# Patient Record
Sex: FEMALE | Race: WHITE | NOT HISPANIC OR LATINO | Employment: OTHER | URBAN - METROPOLITAN AREA
[De-identification: names, ages, dates, MRNs, and addresses within clinical notes are randomized per-mention and may not be internally consistent; named-entity substitution may affect disease eponyms.]

---

## 2017-02-22 ENCOUNTER — APPOINTMENT (OUTPATIENT)
Dept: WOMENS IMAGING | Facility: HOSPITAL | Age: 72
End: 2017-02-22

## 2017-02-22 PROCEDURE — 76641 ULTRASOUND BREAST COMPLETE: CPT | Performed by: RADIOLOGY

## 2017-02-22 PROCEDURE — MDREVIEWSP: Performed by: RADIOLOGY

## 2017-08-23 ENCOUNTER — APPOINTMENT (OUTPATIENT)
Dept: WOMENS IMAGING | Facility: HOSPITAL | Age: 72
End: 2017-08-23

## 2017-08-23 PROCEDURE — 76641 ULTRASOUND BREAST COMPLETE: CPT | Performed by: RADIOLOGY

## 2017-08-23 PROCEDURE — G0202 SCR MAMMO BI INCL CAD: HCPCS | Performed by: RADIOLOGY

## 2017-08-23 PROCEDURE — 77063 BREAST TOMOSYNTHESIS BI: CPT | Performed by: RADIOLOGY

## 2018-01-04 ENCOUNTER — HOSPITAL ENCOUNTER (OUTPATIENT)
Dept: GENERAL RADIOLOGY | Facility: HOSPITAL | Age: 73
Discharge: HOME | End: 2018-01-04
Admitting: ORTHOPAEDIC SURGERY

## 2018-01-04 ENCOUNTER — APPOINTMENT (OUTPATIENT)
Dept: PREADMISSION TESTING | Facility: HOSPITAL | Age: 73
End: 2018-01-04

## 2018-01-04 ENCOUNTER — HOSPITAL ENCOUNTER (OUTPATIENT)
Dept: GENERAL RADIOLOGY | Facility: HOSPITAL | Age: 73
Discharge: HOME | End: 2018-01-04

## 2018-01-04 VITALS
WEIGHT: 206 LBS | SYSTOLIC BLOOD PRESSURE: 111 MMHG | BODY MASS INDEX: 35.17 KG/M2 | TEMPERATURE: 97.4 F | OXYGEN SATURATION: 94 % | RESPIRATION RATE: 18 BRPM | HEIGHT: 64 IN | HEART RATE: 92 BPM | DIASTOLIC BLOOD PRESSURE: 74 MMHG

## 2018-01-04 LAB
ALBUMIN SERPL-MCNC: 4.4 G/DL (ref 3.5–5.2)
ALBUMIN/GLOB SERPL: 1.4 G/DL
ALP SERPL-CCNC: 77 U/L (ref 39–117)
ALT SERPL W P-5'-P-CCNC: 11 U/L (ref 1–33)
ANION GAP SERPL CALCULATED.3IONS-SCNC: 14.7 MMOL/L
APTT PPP: 25.3 SECONDS (ref 22.7–35.4)
AST SERPL-CCNC: 10 U/L (ref 1–32)
BASOPHILS # BLD AUTO: 0.03 10*3/MM3 (ref 0–0.2)
BASOPHILS NFR BLD AUTO: 0.3 % (ref 0–1.5)
BILIRUB SERPL-MCNC: 0.6 MG/DL (ref 0.1–1.2)
BUN BLD-MCNC: 23 MG/DL (ref 8–23)
BUN/CREAT SERPL: 24.7 (ref 7–25)
CALCIUM SPEC-SCNC: 9.6 MG/DL (ref 8.6–10.5)
CHLORIDE SERPL-SCNC: 98 MMOL/L (ref 98–107)
CO2 SERPL-SCNC: 27.3 MMOL/L (ref 22–29)
CREAT BLD-MCNC: 0.93 MG/DL (ref 0.57–1)
DEPRECATED RDW RBC AUTO: 51.6 FL (ref 37–54)
EOSINOPHIL # BLD AUTO: 0.04 10*3/MM3 (ref 0–0.7)
EOSINOPHIL NFR BLD AUTO: 0.4 % (ref 0.3–6.2)
ERYTHROCYTE [DISTWIDTH] IN BLOOD BY AUTOMATED COUNT: 14.3 % (ref 11.7–13)
GFR SERPL CREATININE-BSD FRML MDRD: 59 ML/MIN/1.73
GLOBULIN UR ELPH-MCNC: 3.2 GM/DL
GLUCOSE BLD-MCNC: 136 MG/DL (ref 65–99)
HCT VFR BLD AUTO: 45.6 % (ref 35.6–45.5)
HGB BLD-MCNC: 15 G/DL (ref 11.9–15.5)
IMM GRANULOCYTES # BLD: 0.02 10*3/MM3 (ref 0–0.03)
IMM GRANULOCYTES NFR BLD: 0.2 % (ref 0–0.5)
INR PPP: 1.02 (ref 0.9–1.1)
LYMPHOCYTES # BLD AUTO: 2.88 10*3/MM3 (ref 0.9–4.8)
LYMPHOCYTES NFR BLD AUTO: 27.9 % (ref 19.6–45.3)
MCH RBC QN AUTO: 32.4 PG (ref 26.9–32)
MCHC RBC AUTO-ENTMCNC: 32.9 G/DL (ref 32.4–36.3)
MCV RBC AUTO: 98.5 FL (ref 80.5–98.2)
MONOCYTES # BLD AUTO: 0.69 10*3/MM3 (ref 0.2–1.2)
MONOCYTES NFR BLD AUTO: 6.7 % (ref 5–12)
NEUTROPHILS # BLD AUTO: 6.67 10*3/MM3 (ref 1.9–8.1)
NEUTROPHILS NFR BLD AUTO: 64.5 % (ref 42.7–76)
PLATELET # BLD AUTO: 310 10*3/MM3 (ref 140–500)
PMV BLD AUTO: 10.4 FL (ref 6–12)
POTASSIUM BLD-SCNC: 3.9 MMOL/L (ref 3.5–5.2)
PROT SERPL-MCNC: 7.6 G/DL (ref 6–8.5)
PROTHROMBIN TIME: 13 SECONDS (ref 11.7–14.2)
RBC # BLD AUTO: 4.63 10*6/MM3 (ref 3.9–5.2)
SODIUM BLD-SCNC: 140 MMOL/L (ref 136–145)
WBC NRBC COR # BLD: 10.33 10*3/MM3 (ref 4.5–10.7)

## 2018-01-04 PROCEDURE — 85025 COMPLETE CBC W/AUTO DIFF WBC: CPT | Performed by: ORTHOPAEDIC SURGERY

## 2018-01-04 PROCEDURE — 36415 COLL VENOUS BLD VENIPUNCTURE: CPT

## 2018-01-04 PROCEDURE — 73560 X-RAY EXAM OF KNEE 1 OR 2: CPT

## 2018-01-04 PROCEDURE — 71046 X-RAY EXAM CHEST 2 VIEWS: CPT

## 2018-01-04 PROCEDURE — 93010 ELECTROCARDIOGRAM REPORT: CPT | Performed by: INTERNAL MEDICINE

## 2018-01-04 PROCEDURE — 93005 ELECTROCARDIOGRAM TRACING: CPT

## 2018-01-04 PROCEDURE — 85730 THROMBOPLASTIN TIME PARTIAL: CPT | Performed by: ORTHOPAEDIC SURGERY

## 2018-01-04 PROCEDURE — 85610 PROTHROMBIN TIME: CPT | Performed by: ORTHOPAEDIC SURGERY

## 2018-01-04 PROCEDURE — 80053 COMPREHEN METABOLIC PANEL: CPT | Performed by: ORTHOPAEDIC SURGERY

## 2018-01-04 RX ORDER — POTASSIUM CHLORIDE 1.5 G/1.77G
20 POWDER, FOR SOLUTION ORAL DAILY
COMMUNITY

## 2018-01-04 RX ORDER — SENNOSIDES 8.6 MG
650 CAPSULE ORAL EVERY 8 HOURS PRN
COMMUNITY

## 2018-01-04 RX ORDER — TERAZOSIN 10 MG/1
10 CAPSULE ORAL NIGHTLY
COMMUNITY

## 2018-01-04 RX ORDER — ASPIRIN 81 MG/1
81 TABLET, CHEWABLE ORAL EVERY MORNING
COMMUNITY

## 2018-01-04 RX ORDER — LISINOPRIL AND HYDROCHLOROTHIAZIDE 12.5; 1 MG/1; MG/1
1 TABLET ORAL EVERY MORNING
COMMUNITY

## 2018-01-04 RX ORDER — MELOXICAM 15 MG/1
15 TABLET ORAL EVERY MORNING
COMMUNITY

## 2018-01-04 RX ORDER — LANSOPRAZOLE 30 MG/1
30 CAPSULE, DELAYED RELEASE ORAL EVERY MORNING
COMMUNITY

## 2018-01-04 RX ORDER — ALPRAZOLAM 0.25 MG/1
0.25 TABLET ORAL 2 TIMES DAILY PRN
COMMUNITY

## 2018-01-04 ASSESSMENT — KOOS JR
KOOS JR SCORE: 50.012
KOOS JR SCORE: 15

## 2018-01-04 NOTE — DISCHARGE INSTRUCTIONS
SURGERY 1-11-18   ARRIVAL TIME 8:00    Take the following medications the morning of surgery with a small sip of water:    NONE      General Instructions:  • Do not eat solid food after midnight the night before surgery.  • You may drink clear liquids day of surgery but must stop at least one hour before your hospital arrival time.  • It is beneficial for you to have a clear drink that contains carbohydrates the day of surgery.  We suggest a 12 to 20 ounce bottle of Gatorade or Powerade for non-diabetic patients or a 12 to 20 ounce bottle of G2 or Powerade Zero for diabetic patients. (Pediatric patients, are not advised to drink a 12 to 20 ounce carbohydrate drink)    Clear liquids are liquids you can see through.  Nothing red in color.     Plain water                               Sports drinks  Sodas                                   Gelatin (Jell-O)  Fruit juices without pulp such as white grape juice and apple juice  Popsicles that contain no fruit or yogurt  Tea or coffee (no cream or milk added)  Gatorade / Powerade  G2 / Powerade Zero    • Infants may have breast milk up to four hours before surgery.  • Infants drinking formula may drink formula up to six hours before surgery.   • Patients who avoid smoking, chewing tobacco and alcohol for 4 weeks prior to surgery have a reduced risk of post-operative complications.  Quit smoking as many days before surgery as you can.  • Do not smoke, use chewing tobacco or drink alcohol the day of surgery.   • If applicable bring your C-PAP/ BI-PAP machine.  • Bring any papers given to you in the doctor’s office.  • Wear clean comfortable clothes and socks.  • Do not wear contact lenses or make-up.  Bring a case for your glasses.   • Bring crutches or walker if applicable.  • Remove all piercings.  Leave jewelry and any other valuables at home.  • Hair extensions with metal clips must be removed prior to surgery.  • The Pre-Admission Testing nurse will instruct you to bring  medications if unable to obtain an accurate list in Pre-Admission Testing.        If you were given a blood bank ID arm band remember to bring it with you the day of surgery.    Preventing a Surgical Site Infection:  • For 2 to 3 days before surgery, avoid shaving with a razor because the razor can irritate skin and make it easier to develop an infection.  • The night prior to surgery sleep in a clean bed with clean clothing.  Do not allow pets to sleep with you.  • Shower on the morning of surgery using a fresh bar of anti-bacterial soap (such as Dial) and clean washcloth.  Dry with a clean towel and dress in clean clothing.  • Ask your surgeon if you will be receiving antibiotics prior to surgery.  • Make sure you, your family, and all healthcare providers clean their hands with soap and water or an alcohol based hand  before caring for you or your wound.    Day of surgery:  Upon arrival, a Pre-op nurse and Anesthesiologist will review your health history, obtain vital signs, and answer questions you may have.  The only belongings needed at this time will be your home medications and if applicable your C-PAP/BI-PAP machine.  If you are staying overnight your family can leave the rest of your belongings in the car and bring them to your room later.  A Pre-op nurse will start an IV and you may receive medication in preparation for surgery, including something to help you relax.  Your family will be able to see you in the Pre-op area.  While you are in surgery your family should notify the waiting room  if they leave the waiting room area and provide a contact phone number.    Please be aware that surgery does come with discomfort.  We want to make every effort to control your discomfort so please discuss any uncontrolled symptoms with your nurse.   Your doctor will most likely have prescribed pain medications.      If you are going home after surgery you will receive individualized written care  instructions before being discharged.  A responsible adult must drive you to and from the hospital on the day of your surgery and stay with you for 24 hours.    If you are staying overnight following surgery, you will be transported to your hospital room following the recovery period.  [facility] has all private rooms.    If you have any questions please call Pre-Admission Testing at [phone].  Deductibles and co-payments are collected on the day of service. Please be prepared to pay the required co-pay, deductible or deposit on the day of service as defined by your plan.USE AS DIRECTED    2% CHLORAHEXIDINE GLUCONATE* CLOTH  Preparing or “prepping” skin before surgery can reduce the risk of infection at the surgical site. To make the process easier, [facility] has chosen disposable cloths moistened with a rinse-free, 2% Chlorhexidine Gluconate (CHG) antiseptic solution. The steps below outline the prepping process and should be carefully followed.        Use the prep cloth on the area that is circled in the diagram             Directions Night before Surgery  1) Shower using a fresh bar of anti-bacterial soap (such as Dial) and clean washcloth.  Use a clean towel to completely dry your skin.  2) Do not use any lotions, oils or creams on your skin.  3) Open the package and remove 1 cloth, wipe your skin for 30 seconds in a circular motion.  Allow to dry for 3 minutes.  4) Repeat #3 with second cloth.  5) Do not touch your eyes, ears, or mouth with the prep cloth.  6) Allow the wet prep solution to air dry.  7) Discard the prep cloth and wash your hands with soap and water.   8) Dress in clean bed clothes and sleep on fresh clean bed sheets.   9) You may experience some temporary itching after the prep.    Directions Day of Surgery  1) Repeat steps 1,2,3,4,5,6,7, and 9.   2) Dress in clean clothes before coming to the hospital.    BACTROBAN NASAL OINTMENT  There are many germs normally in  your nose. Bactroban is an ointment that will help reduce these germs. Please follow these instructions for Bactroban use:    ____Two days before surgery in the evening Date________    ____The day before surgery in the morning  Date________    ____The day before surgery in the evening              Date________    ____The day of surgery in the morning    Date________    **Squirt ½ package of Bactroban Ointment onto a cotton applicator and apply to inside of 1st nostril.  Squirt the remaining Bactroban and apply to the inside of the other nostril.

## 2018-01-05 LAB
BILIRUB UR QL STRIP: NEGATIVE
CLARITY UR: CLEAR
COLOR UR: YELLOW
GLUCOSE UR STRIP-MCNC: NEGATIVE MG/DL
HGB UR QL STRIP.AUTO: NEGATIVE
KETONES UR QL STRIP: NEGATIVE
LEUKOCYTE ESTERASE UR QL STRIP.AUTO: NEGATIVE
NITRITE UR QL STRIP: NEGATIVE
PH UR STRIP.AUTO: 6.5 [PH] (ref 5–8)
PROT UR QL STRIP: NEGATIVE
SP GR UR STRIP: 1.01 (ref 1–1.03)
UROBILINOGEN UR QL STRIP: NORMAL

## 2018-01-05 PROCEDURE — 81003 URINALYSIS AUTO W/O SCOPE: CPT | Performed by: ORTHOPAEDIC SURGERY

## 2018-01-11 ENCOUNTER — APPOINTMENT (OUTPATIENT)
Dept: GENERAL RADIOLOGY | Facility: HOSPITAL | Age: 73
End: 2018-01-11

## 2018-01-11 ENCOUNTER — ANESTHESIA (OUTPATIENT)
Dept: PERIOP | Facility: HOSPITAL | Age: 73
End: 2018-01-11

## 2018-01-11 ENCOUNTER — HOSPITAL ENCOUNTER (OUTPATIENT)
Facility: HOSPITAL | Age: 73
Discharge: HOME | End: 2018-01-13
Attending: ORTHOPAEDIC SURGERY | Admitting: ORTHOPAEDIC SURGERY

## 2018-01-11 ENCOUNTER — ANESTHESIA EVENT (OUTPATIENT)
Dept: PERIOP | Facility: HOSPITAL | Age: 73
End: 2018-01-11

## 2018-01-11 DIAGNOSIS — R26.89 DECREASED MOBILITY: Primary | ICD-10-CM

## 2018-01-11 PROBLEM — M17.9 OSTEOARTHRITIS, KNEE: Status: ACTIVE | Noted: 2018-01-11

## 2018-01-11 PROCEDURE — A9270 NON-COVERED ITEM OR SERVICE: HCPCS | Performed by: ORTHOPAEDIC SURGERY

## 2018-01-11 PROCEDURE — 63710000001 ASPIRIN EC 325 MG TABLET DELAYED-RELEASE: Performed by: ORTHOPAEDIC SURGERY

## 2018-01-11 PROCEDURE — 25010000002 HYDROMORPHONE PER 4 MG: Performed by: NURSE ANESTHETIST, CERTIFIED REGISTERED

## 2018-01-11 PROCEDURE — C1776 JOINT DEVICE (IMPLANTABLE): HCPCS | Performed by: ORTHOPAEDIC SURGERY

## 2018-01-11 PROCEDURE — 25010000002 FENTANYL CITRATE (PF) 100 MCG/2ML SOLUTION: Performed by: NURSE ANESTHETIST, CERTIFIED REGISTERED

## 2018-01-11 PROCEDURE — 25010000003 CEFAZOLIN IN DEXTROSE 2-4 GM/100ML-% SOLUTION: Performed by: NURSE ANESTHETIST, CERTIFIED REGISTERED

## 2018-01-11 PROCEDURE — 25010000002 ONDANSETRON PER 1 MG: Performed by: ORTHOPAEDIC SURGERY

## 2018-01-11 PROCEDURE — G8979 MOBILITY GOAL STATUS: HCPCS

## 2018-01-11 PROCEDURE — 25010000002 DEXAMETHASONE PER 1 MG: Performed by: NURSE ANESTHETIST, CERTIFIED REGISTERED

## 2018-01-11 PROCEDURE — 97110 THERAPEUTIC EXERCISES: CPT

## 2018-01-11 PROCEDURE — 25010000002 ONDANSETRON PER 1 MG: Performed by: NURSE ANESTHETIST, CERTIFIED REGISTERED

## 2018-01-11 PROCEDURE — 73560 X-RAY EXAM OF KNEE 1 OR 2: CPT

## 2018-01-11 PROCEDURE — G8978 MOBILITY CURRENT STATUS: HCPCS

## 2018-01-11 PROCEDURE — C1713 ANCHOR/SCREW BN/BN,TIS/BN: HCPCS | Performed by: ORTHOPAEDIC SURGERY

## 2018-01-11 PROCEDURE — 25010000002 VANCOMYCIN 10 G RECONSTITUTED SOLUTION: Performed by: ORTHOPAEDIC SURGERY

## 2018-01-11 PROCEDURE — 94799 UNLISTED PULMONARY SVC/PX: CPT

## 2018-01-11 PROCEDURE — 25010000002 CEFAZOLIN PER 500 MG: Performed by: ORTHOPAEDIC SURGERY

## 2018-01-11 PROCEDURE — 25010000002 MIDAZOLAM PER 1 MG: Performed by: ANESTHESIOLOGY

## 2018-01-11 PROCEDURE — 63710000001 ACETAMINOPHEN 500 MG TABLET: Performed by: ORTHOPAEDIC SURGERY

## 2018-01-11 PROCEDURE — 97162 PT EVAL MOD COMPLEX 30 MIN: CPT

## 2018-01-11 PROCEDURE — 25010000002 PROPOFOL 10 MG/ML EMULSION: Performed by: NURSE ANESTHETIST, CERTIFIED REGISTERED

## 2018-01-11 PROCEDURE — 25010000002 KETOROLAC TROMETHAMINE PER 15 MG: Performed by: ORTHOPAEDIC SURGERY

## 2018-01-11 DEVICE — JOURNEY II CR INSERT XLPE LEFT SIZE 3-4 10MM
Type: IMPLANTABLE DEVICE | Site: KNEE | Status: FUNCTIONAL
Brand: JOURNEY

## 2018-01-11 DEVICE — JOURNEY II CR FEMORAL OXINIUM NONPOROUS LEFT SIZE 4
Type: IMPLANTABLE DEVICE | Site: KNEE | Status: FUNCTIONAL
Brand: JOURNEY

## 2018-01-11 DEVICE — CMT BONE PALACOS 120001: Type: IMPLANTABLE DEVICE | Site: KNEE | Status: FUNCTIONAL

## 2018-01-11 DEVICE — JOURNEY TIBIAL BASEPLATE NONPOROUS                                    LEFT SIZE 3
Type: IMPLANTABLE DEVICE | Site: KNEE | Status: FUNCTIONAL
Brand: JOURNEY

## 2018-01-11 DEVICE — JOURNEY 7.5 ROUND RESURF PAT 32MM STANDARD
Type: IMPLANTABLE DEVICE | Site: KNEE | Status: FUNCTIONAL
Brand: JOURNEY

## 2018-01-11 DEVICE — IMPLANTABLE DEVICE: Type: IMPLANTABLE DEVICE | Site: KNEE | Status: FUNCTIONAL

## 2018-01-11 RX ORDER — ACETAMINOPHEN 500 MG
1000 TABLET ORAL ONCE
Status: COMPLETED | OUTPATIENT
Start: 2018-01-11 | End: 2018-01-11

## 2018-01-11 RX ORDER — FLUMAZENIL 0.1 MG/ML
0.2 INJECTION INTRAVENOUS AS NEEDED
Status: DISCONTINUED | OUTPATIENT
Start: 2018-01-11 | End: 2018-01-11 | Stop reason: HOSPADM

## 2018-01-11 RX ORDER — SODIUM CHLORIDE 0.9 % (FLUSH) 0.9 %
1-10 SYRINGE (ML) INJECTION AS NEEDED
Status: DISCONTINUED | OUTPATIENT
Start: 2018-01-11 | End: 2018-01-11 | Stop reason: HOSPADM

## 2018-01-11 RX ORDER — BISACODYL 10 MG
10 SUPPOSITORY, RECTAL RECTAL DAILY PRN
Status: DISCONTINUED | OUTPATIENT
Start: 2018-01-11 | End: 2018-01-13 | Stop reason: HOSPADM

## 2018-01-11 RX ORDER — FAMOTIDINE 10 MG/ML
20 INJECTION, SOLUTION INTRAVENOUS ONCE
Status: COMPLETED | OUTPATIENT
Start: 2018-01-11 | End: 2018-01-11

## 2018-01-11 RX ORDER — SODIUM CHLORIDE, SODIUM LACTATE, POTASSIUM CHLORIDE, CALCIUM CHLORIDE 600; 310; 30; 20 MG/100ML; MG/100ML; MG/100ML; MG/100ML
9 INJECTION, SOLUTION INTRAVENOUS CONTINUOUS
Status: DISCONTINUED | OUTPATIENT
Start: 2018-01-11 | End: 2018-01-13 | Stop reason: HOSPADM

## 2018-01-11 RX ORDER — FENTANYL CITRATE 50 UG/ML
50 INJECTION, SOLUTION INTRAMUSCULAR; INTRAVENOUS
Status: DISCONTINUED | OUTPATIENT
Start: 2018-01-11 | End: 2018-01-11 | Stop reason: HOSPADM

## 2018-01-11 RX ORDER — ONDANSETRON 2 MG/ML
4 INJECTION INTRAMUSCULAR; INTRAVENOUS EVERY 6 HOURS PRN
Status: DISCONTINUED | OUTPATIENT
Start: 2018-01-11 | End: 2018-01-13 | Stop reason: HOSPADM

## 2018-01-11 RX ORDER — OXYCODONE HYDROCHLORIDE AND ACETAMINOPHEN 5; 325 MG/1; MG/1
1 TABLET ORAL EVERY 4 HOURS PRN
Status: DISCONTINUED | OUTPATIENT
Start: 2018-01-11 | End: 2018-01-12

## 2018-01-11 RX ORDER — LIDOCAINE HYDROCHLORIDE 20 MG/ML
INJECTION, SOLUTION INFILTRATION; PERINEURAL AS NEEDED
Status: DISCONTINUED | OUTPATIENT
Start: 2018-01-11 | End: 2018-01-11 | Stop reason: SURG

## 2018-01-11 RX ORDER — LABETALOL HYDROCHLORIDE 5 MG/ML
5 INJECTION, SOLUTION INTRAVENOUS
Status: DISCONTINUED | OUTPATIENT
Start: 2018-01-11 | End: 2018-01-11 | Stop reason: HOSPADM

## 2018-01-11 RX ORDER — OXYCODONE HYDROCHLORIDE AND ACETAMINOPHEN 5; 325 MG/1; MG/1
2 TABLET ORAL EVERY 4 HOURS PRN
Status: DISCONTINUED | OUTPATIENT
Start: 2018-01-11 | End: 2018-01-12

## 2018-01-11 RX ORDER — FERROUS SULFATE 325(65) MG
325 TABLET ORAL
Status: DISCONTINUED | OUTPATIENT
Start: 2018-01-12 | End: 2018-01-13 | Stop reason: HOSPADM

## 2018-01-11 RX ORDER — LIDOCAINE HYDROCHLORIDE 10 MG/ML
0.5 INJECTION, SOLUTION EPIDURAL; INFILTRATION; INTRACAUDAL; PERINEURAL ONCE AS NEEDED
Status: DISCONTINUED | OUTPATIENT
Start: 2018-01-11 | End: 2018-01-11 | Stop reason: HOSPADM

## 2018-01-11 RX ORDER — ACETAMINOPHEN 325 MG/1
650 TABLET ORAL ONCE AS NEEDED
Status: DISCONTINUED | OUTPATIENT
Start: 2018-01-11 | End: 2018-01-11 | Stop reason: HOSPADM

## 2018-01-11 RX ORDER — BISACODYL 5 MG/1
10 TABLET, DELAYED RELEASE ORAL DAILY PRN
Status: DISCONTINUED | OUTPATIENT
Start: 2018-01-11 | End: 2018-01-13 | Stop reason: HOSPADM

## 2018-01-11 RX ORDER — PROPOFOL 10 MG/ML
VIAL (ML) INTRAVENOUS AS NEEDED
Status: DISCONTINUED | OUTPATIENT
Start: 2018-01-11 | End: 2018-01-11 | Stop reason: SURG

## 2018-01-11 RX ORDER — MIDAZOLAM HYDROCHLORIDE 1 MG/ML
2 INJECTION INTRAMUSCULAR; INTRAVENOUS
Status: DISCONTINUED | OUTPATIENT
Start: 2018-01-11 | End: 2018-01-11 | Stop reason: HOSPADM

## 2018-01-11 RX ORDER — SODIUM CHLORIDE 0.9 % (FLUSH) 0.9 %
1-10 SYRINGE (ML) INJECTION AS NEEDED
Status: DISCONTINUED | OUTPATIENT
Start: 2018-01-11 | End: 2018-01-13 | Stop reason: HOSPADM

## 2018-01-11 RX ORDER — MIDAZOLAM HYDROCHLORIDE 1 MG/ML
1 INJECTION INTRAMUSCULAR; INTRAVENOUS
Status: DISCONTINUED | OUTPATIENT
Start: 2018-01-11 | End: 2018-01-11 | Stop reason: HOSPADM

## 2018-01-11 RX ORDER — ACETAMINOPHEN 650 MG/1
650 SUPPOSITORY RECTAL EVERY 4 HOURS PRN
Status: DISCONTINUED | OUTPATIENT
Start: 2018-01-11 | End: 2018-01-13 | Stop reason: HOSPADM

## 2018-01-11 RX ORDER — CEFAZOLIN SODIUM 2 G/100ML
INJECTION, SOLUTION INTRAVENOUS AS NEEDED
Status: DISCONTINUED | OUTPATIENT
Start: 2018-01-11 | End: 2018-01-11 | Stop reason: SURG

## 2018-01-11 RX ORDER — NALOXONE HCL 0.4 MG/ML
0.1 VIAL (ML) INJECTION
Status: DISCONTINUED | OUTPATIENT
Start: 2018-01-11 | End: 2018-01-13 | Stop reason: HOSPADM

## 2018-01-11 RX ORDER — TERAZOSIN 5 MG/1
10 CAPSULE ORAL NIGHTLY
Status: DISCONTINUED | OUTPATIENT
Start: 2018-01-11 | End: 2018-01-13 | Stop reason: HOSPADM

## 2018-01-11 RX ORDER — HYDROMORPHONE HCL 110MG/55ML
0.5 PATIENT CONTROLLED ANALGESIA SYRINGE INTRAVENOUS
Status: DISCONTINUED | OUTPATIENT
Start: 2018-01-11 | End: 2018-01-11 | Stop reason: HOSPADM

## 2018-01-11 RX ORDER — PANTOPRAZOLE SODIUM 40 MG/1
40 TABLET, DELAYED RELEASE ORAL EVERY MORNING
Status: DISCONTINUED | OUTPATIENT
Start: 2018-01-12 | End: 2018-01-13 | Stop reason: HOSPADM

## 2018-01-11 RX ORDER — ONDANSETRON 2 MG/ML
4 INJECTION INTRAMUSCULAR; INTRAVENOUS ONCE AS NEEDED
Status: COMPLETED | OUTPATIENT
Start: 2018-01-11 | End: 2018-01-11

## 2018-01-11 RX ORDER — ACETAMINOPHEN 325 MG/1
650 TABLET ORAL EVERY 4 HOURS PRN
Status: DISCONTINUED | OUTPATIENT
Start: 2018-01-11 | End: 2018-01-13 | Stop reason: HOSPADM

## 2018-01-11 RX ORDER — LISINOPRIL AND HYDROCHLOROTHIAZIDE 12.5; 1 MG/1; MG/1
1 TABLET ORAL EVERY MORNING
Status: DISCONTINUED | OUTPATIENT
Start: 2018-01-12 | End: 2018-01-13 | Stop reason: HOSPADM

## 2018-01-11 RX ORDER — FENTANYL CITRATE 50 UG/ML
INJECTION, SOLUTION INTRAMUSCULAR; INTRAVENOUS AS NEEDED
Status: DISCONTINUED | OUTPATIENT
Start: 2018-01-11 | End: 2018-01-11 | Stop reason: SURG

## 2018-01-11 RX ORDER — HYDRALAZINE HYDROCHLORIDE 20 MG/ML
5 INJECTION INTRAMUSCULAR; INTRAVENOUS
Status: DISCONTINUED | OUTPATIENT
Start: 2018-01-11 | End: 2018-01-11 | Stop reason: HOSPADM

## 2018-01-11 RX ORDER — ASPIRIN 325 MG
325 TABLET, DELAYED RELEASE (ENTERIC COATED) ORAL DAILY
Status: DISCONTINUED | OUTPATIENT
Start: 2018-01-11 | End: 2018-01-13 | Stop reason: HOSPADM

## 2018-01-11 RX ORDER — HYDROMORPHONE HCL 110MG/55ML
PATIENT CONTROLLED ANALGESIA SYRINGE INTRAVENOUS AS NEEDED
Status: DISCONTINUED | OUTPATIENT
Start: 2018-01-11 | End: 2018-01-11 | Stop reason: SURG

## 2018-01-11 RX ORDER — ACETAMINOPHEN 325 MG/1
325 TABLET ORAL EVERY 4 HOURS PRN
Status: DISCONTINUED | OUTPATIENT
Start: 2018-01-11 | End: 2018-01-13 | Stop reason: HOSPADM

## 2018-01-11 RX ORDER — DEXAMETHASONE SODIUM PHOSPHATE 10 MG/ML
INJECTION INTRAMUSCULAR; INTRAVENOUS AS NEEDED
Status: DISCONTINUED | OUTPATIENT
Start: 2018-01-11 | End: 2018-01-11 | Stop reason: SURG

## 2018-01-11 RX ORDER — SODIUM CHLORIDE, SODIUM LACTATE, POTASSIUM CHLORIDE, CALCIUM CHLORIDE 600; 310; 30; 20 MG/100ML; MG/100ML; MG/100ML; MG/100ML
100 INJECTION, SOLUTION INTRAVENOUS CONTINUOUS
Status: DISCONTINUED | OUTPATIENT
Start: 2018-01-11 | End: 2018-01-13 | Stop reason: HOSPADM

## 2018-01-11 RX ORDER — POTASSIUM CHLORIDE 750 MG/1
20 CAPSULE, EXTENDED RELEASE ORAL DAILY
Status: DISCONTINUED | OUTPATIENT
Start: 2018-01-11 | End: 2018-01-13 | Stop reason: HOSPADM

## 2018-01-11 RX ORDER — MAGNESIUM HYDROXIDE 1200 MG/15ML
LIQUID ORAL AS NEEDED
Status: DISCONTINUED | OUTPATIENT
Start: 2018-01-11 | End: 2018-01-11 | Stop reason: HOSPADM

## 2018-01-11 RX ORDER — KETOROLAC TROMETHAMINE 15 MG/ML
15 INJECTION, SOLUTION INTRAMUSCULAR; INTRAVENOUS EVERY 6 HOURS
Status: COMPLETED | OUTPATIENT
Start: 2018-01-11 | End: 2018-01-12

## 2018-01-11 RX ORDER — ACETAMINOPHEN 160 MG/5ML
650 SOLUTION ORAL EVERY 4 HOURS PRN
Status: DISCONTINUED | OUTPATIENT
Start: 2018-01-11 | End: 2018-01-13 | Stop reason: HOSPADM

## 2018-01-11 RX ORDER — SENNA AND DOCUSATE SODIUM 50; 8.6 MG/1; MG/1
2 TABLET, FILM COATED ORAL ONCE
Status: DISCONTINUED | OUTPATIENT
Start: 2018-01-11 | End: 2018-01-13 | Stop reason: HOSPADM

## 2018-01-11 RX ORDER — ALBUTEROL SULFATE 2.5 MG/3ML
2.5 SOLUTION RESPIRATORY (INHALATION) ONCE AS NEEDED
Status: DISCONTINUED | OUTPATIENT
Start: 2018-01-11 | End: 2018-01-11 | Stop reason: HOSPADM

## 2018-01-11 RX ORDER — ONDANSETRON 2 MG/ML
INJECTION INTRAMUSCULAR; INTRAVENOUS AS NEEDED
Status: DISCONTINUED | OUTPATIENT
Start: 2018-01-11 | End: 2018-01-11 | Stop reason: SURG

## 2018-01-11 RX ORDER — ONDANSETRON 4 MG/1
4 TABLET, FILM COATED ORAL EVERY 6 HOURS PRN
Status: DISCONTINUED | OUTPATIENT
Start: 2018-01-11 | End: 2018-01-13 | Stop reason: HOSPADM

## 2018-01-11 RX ORDER — NALOXONE HCL 0.4 MG/ML
0.2 VIAL (ML) INJECTION AS NEEDED
Status: DISCONTINUED | OUTPATIENT
Start: 2018-01-11 | End: 2018-01-11 | Stop reason: HOSPADM

## 2018-01-11 RX ORDER — TRANEXAMIC ACID 100 MG/ML
INJECTION, SOLUTION INTRAVENOUS AS NEEDED
Status: DISCONTINUED | OUTPATIENT
Start: 2018-01-11 | End: 2018-01-11 | Stop reason: SURG

## 2018-01-11 RX ORDER — ONDANSETRON 4 MG/1
4 TABLET, ORALLY DISINTEGRATING ORAL EVERY 6 HOURS PRN
Status: DISCONTINUED | OUTPATIENT
Start: 2018-01-11 | End: 2018-01-13 | Stop reason: HOSPADM

## 2018-01-11 RX ADMIN — TRANEXAMIC ACID 1000 MG: 100 INJECTION, SOLUTION INTRAVENOUS at 11:40

## 2018-01-11 RX ADMIN — FENTANYL CITRATE 25 MCG: 50 INJECTION INTRAMUSCULAR; INTRAVENOUS at 10:59

## 2018-01-11 RX ADMIN — KETOROLAC TROMETHAMINE 15 MG: 30 INJECTION, SOLUTION INTRAMUSCULAR at 18:43

## 2018-01-11 RX ADMIN — FENTANYL CITRATE 50 MCG: 50 INJECTION INTRAMUSCULAR; INTRAVENOUS at 11:14

## 2018-01-11 RX ADMIN — ONDANSETRON 4 MG: 2 INJECTION INTRAMUSCULAR; INTRAVENOUS at 11:59

## 2018-01-11 RX ADMIN — CEFAZOLIN SODIUM 2 G: 2 INJECTION, SOLUTION INTRAVENOUS at 11:57

## 2018-01-11 RX ADMIN — HYDROMORPHONE HYDROCHLORIDE 0.5 MG: 2 INJECTION INTRAMUSCULAR; INTRAVENOUS; SUBCUTANEOUS at 11:35

## 2018-01-11 RX ADMIN — FENTANYL CITRATE 25 MCG: 50 INJECTION INTRAMUSCULAR; INTRAVENOUS at 10:50

## 2018-01-11 RX ADMIN — Medication 1 MG: at 10:38

## 2018-01-11 RX ADMIN — DEXAMETHASONE SODIUM PHOSPHATE 4 MG: 10 INJECTION INTRAMUSCULAR; INTRAVENOUS at 11:11

## 2018-01-11 RX ADMIN — ONDANSETRON 4 MG: 2 INJECTION INTRAMUSCULAR; INTRAVENOUS at 19:51

## 2018-01-11 RX ADMIN — SODIUM CHLORIDE, POTASSIUM CHLORIDE, SODIUM LACTATE AND CALCIUM CHLORIDE: 600; 310; 30; 20 INJECTION, SOLUTION INTRAVENOUS at 12:05

## 2018-01-11 RX ADMIN — ACETAMINOPHEN 1000 MG: 500 TABLET ORAL at 10:36

## 2018-01-11 RX ADMIN — ONDANSETRON 4 MG: 2 INJECTION INTRAMUSCULAR; INTRAVENOUS at 13:14

## 2018-01-11 RX ADMIN — FENTANYL CITRATE 50 MCG: 50 INJECTION INTRAMUSCULAR; INTRAVENOUS at 11:05

## 2018-01-11 RX ADMIN — PROPOFOL 160 MG: 10 INJECTION, EMULSION INTRAVENOUS at 10:52

## 2018-01-11 RX ADMIN — ASPIRIN 325 MG: 325 TABLET, COATED ORAL at 20:53

## 2018-01-11 RX ADMIN — KETOROLAC TROMETHAMINE 15 MG: 30 INJECTION, SOLUTION INTRAMUSCULAR at 13:31

## 2018-01-11 RX ADMIN — FAMOTIDINE 20 MG: 10 INJECTION, SOLUTION INTRAVENOUS at 10:33

## 2018-01-11 RX ADMIN — SODIUM CHLORIDE, POTASSIUM CHLORIDE, SODIUM LACTATE AND CALCIUM CHLORIDE 9 ML/HR: 600; 310; 30; 20 INJECTION, SOLUTION INTRAVENOUS at 10:33

## 2018-01-11 RX ADMIN — CEFAZOLIN SODIUM 2 G: 10 INJECTION, POWDER, FOR SOLUTION INTRAVENOUS at 18:43

## 2018-01-11 RX ADMIN — FENTANYL CITRATE 50 MCG: 50 INJECTION, SOLUTION INTRAMUSCULAR; INTRAVENOUS at 13:15

## 2018-01-11 RX ADMIN — FENTANYL CITRATE 50 MCG: 50 INJECTION, SOLUTION INTRAMUSCULAR; INTRAVENOUS at 13:55

## 2018-01-11 RX ADMIN — LIDOCAINE HYDROCHLORIDE 60 MG: 20 INJECTION, SOLUTION INFILTRATION; PERINEURAL at 10:52

## 2018-01-11 RX ADMIN — HYDROMORPHONE HYDROCHLORIDE 0.5 MG: 2 INJECTION INTRAMUSCULAR; INTRAVENOUS; SUBCUTANEOUS at 12:25

## 2018-01-11 RX ADMIN — VANCOMYCIN HYDROCHLORIDE 1500 MG: 100 INJECTION, POWDER, LYOPHILIZED, FOR SOLUTION INTRAVENOUS at 09:38

## 2018-01-11 NOTE — ANESTHESIA PREPROCEDURE EVALUATION
Anesthesia Evaluation     Patient summary reviewed and Nursing notes reviewed   NPO Solid Status: > 8 hours  NPO Liquid Status: > 4 hours     Airway   Mallampati: II  Neck ROM: full  no difficulty expected  Dental    (+) lower dentures and upper dentures    Pulmonary     breath sounds clear to auscultation  Cardiovascular     Rhythm: regular    (+) hypertension,       Neuro/Psych  (+) psychiatric history Anxiety,     GI/Hepatic/Renal/Endo    (+) obesity,  GERD,     Musculoskeletal     Abdominal   (+) obese,    Substance History      OB/GYN          Other   (+) arthritis   history of cancer                                            Anesthesia Plan    ASA 2     general     intravenous induction   Anesthetic plan and risks discussed with patient.

## 2018-01-11 NOTE — OP NOTE
Philipp Sutton M.D. - [facility]    Patient Name:  Liseth Flores  YOB: 1945  Medical Records Number:  [id number]    Date of Procedure:  1/11/2018    Pre-operative Diagnosis:  DJD Left Knee    Post-operative Diagnosis:  DJD Left Knee    Procedure Performed:  Left Total Knee Replacement     Anesthesia: General, supplemented by a periarticular Exparel/bupivacaine injection.      Surgeon: Philipp Sutton MD     Assistant: Hernandez Willingham MD; note that as part of the surgical procedure, I utilized service of an assistant surgeon, specifically Hernandez Willingham MD. Assistant surgeon participated in crucial portion of the operation. Use of the assistant surgeon greatly reduced overall operative time, thus significantly reducing overall morbidity for the patient.      Implants:      Implant Name Type Inv. Item Serial No.  Lot No. LRB No. Used Action   CMT BONE PALACOS [id number] - [id number] Implant CMT BONE PALACOS [id number]  [employer] [id number] Left 2 Implanted   PATELLA RESRF JOURNEY 7.5X32MM RND - [id number] Implant PATELLA RESRF JOURNEY 7.5X32MM RND  [employer] [id number] Left 1 Implanted   BASEPLT TIB JOURNEY3 BICRUCIATE STBL NONPOR LT - [id number] Implant BASEPLT TIB JOURNEY3 BICRUCIATE STBL NONPOR LT  [employer] [id number] Left 1 Implanted   COMP FEM JOURNEY2 OXINIUM CR NONPOR SZ4 LT - [id number] Implant COMP FEM JOURNEY2 OXINIUM CR NONPOR SZ4 LT  [employer] [id number] Left 1 Implanted   INSRT ART JOURNEY2 CR SZ3TO4 10MM LT - [id number] Implant INSRT ART JOURNEY2 CR SZ3TO4 10MM LT   [employer] [id number] Left 1 Implanted       Implants utilized: I used the Younger and nephew journey 2 system.  I used a size 3 tibial baseplate.  Size 4  femur.  10 CR mm  polyethylene insert.  32 patella.    Tourniquet Time: Was 46 minutes.      Medications: Note that we gave 1 g IV tranexamic acid when the cement was mixed.      Estimated Blood Loss:    minimal    Specimens: * No orders in the log *     Complications: None.      Quality Measures: I have documented the patient's current medications including dosage, frequency, and route of administration in medical record today. Conservative alternatives were discussed with the patient as part of the decision-making process prior to the procedure. The patient was evaluated immediately preoperatively for cardiovascular and thromboembolic risk factors.  There were no acute risk factors. 10 Prophylactic IV antibiotics were administered before the tourniquet went up.      Description of Procedure: After prep and drape, Left knee was approached via midline longitudinal anterior incision. Medial parapatellar arthrotomy was extended to the vastus medialis obliquus which was split in line with the fibers near the medial border, in keeping with minimally invasive technique. Patella was osteotomized proper depth for the patella implant. [place] holes are created. Patella was subluxed and protected. Intramedullary instrumentation was used on each side of the joint; careful and thorough canal content irrigation. I cut the femur 10 mm depth, 5° valgus controlling rotation. The femur  was sized appropriatelyt. Anterior, posterior, and chamfer cuts were made. Tibia is cut 10 mm depth, 5° of posterior slope. Meniscectomies are completed. Trial reduction is performed. Rotation is marked and keel slot was created.      Bony surface was thoroughly cleaned and dried. I injected the posterior capsule and medial lateral gutter with Exparel solution containing 20 mL Exparel, 25 mL 0.25% bupivacaine with epinephrine, 20 mL saline. Care was taken to protect popliteal neurovascular structures and the peroneal nerve. I cemented the tibial baseplate,  Femur, and patella.  Trial reduction revealed a 10 mm CR polyethylene insert best balanced stability and range of motion, and is locked in the tibial tray.      Tourniquet was released; hemostasis  obtained. Wound was closed in layers with #1 Vicryl on the capsule, 2-0 Vicryl in subcutaneous tissue, and staples in the skin over a 1/8-inch drain. Note that I injected my capsule with my Exparel solution during closure.      Philipp Sutton MD  1/11/2018  5:08 PM

## 2018-01-11 NOTE — PLAN OF CARE
Problem: Perioperative Period (Adult)  Goal: Signs and Symptoms of Listed Potential Problems Will be Absent or Manageable (Perioperative Period)  Outcome: Ongoing (interventions implemented as appropriate)   01/11/18 1016   Perioperative Period   Problems Assessed (Perioperative Period) pain;infection   Problems Present (Perioperative Period) pain

## 2018-01-11 NOTE — ANESTHESIA POSTPROCEDURE EVALUATION
"Patient: Liseth Flores    Procedure Summary     Date Anesthesia Start Anesthesia Stop Room / Location    01/11/18 1047 1227 [facility] / [facility]       Procedure Diagnosis Surgeon Provider    LT TOTAL KNEE ARTHROPLASTY (Left Knee) No diagnosis on file. MD Kelvin Love MD          Anesthesia Type: general  Last vitals  BP   142/55 (01/11/18 1435)   Temp   36.6 °C (97.8 °F) (01/11/18 1223)   Pulse   65 (01/11/18 1435)   Resp   14 (01/11/18 1435)     SpO2   98 % (01/11/18 1435)     Post Anesthesia Care and Evaluation    Patient location during evaluation: bedside  Patient participation: complete - patient participated  Level of consciousness: awake  Pain score: 2  Pain management: adequate  Airway patency: patent  Anesthetic complications: No anesthetic complications    Cardiovascular status: acceptable  Respiratory status: acceptable  Hydration status: acceptable    Comments: /55  Pulse 65  Temp 36.6 °C (97.8 °F) (Oral)   Resp 14  Ht 162.6 cm (64\")  Wt 93.8 kg (206 lb 12.8 oz)  SpO2 98%  BMI 35.5 kg/m2        "

## 2018-01-11 NOTE — ANESTHESIA PROCEDURE NOTES
Airway  Urgency: elective    Airway not difficult    General Information and Staff    Patient location during procedure: OR  Anesthesiologist: DUSTIN AGUILERA  CRNA: NURIA LONG    Indications and Patient Condition  Indications for airway management: airway protection    Preoxygenated: yes (Pt pre-O2 with 100% O2)  MILS not maintained throughout  Mask difficulty assessment: 0 - not attempted    Final Airway Details  Final airway type: supraglottic airway      Successful airway: classic  Size 4    Number of attempts at approach: 1    Additional Comments  ATOLMA x1. No change in dentition. + ETCO2. Airway seal pressure <20cm H2O.

## 2018-01-11 NOTE — THERAPY EVALUATION
Acute Care - Physical Therapy Initial Evaluation  [facility]     Patient Name: Liseth Flores  : 1945  MRN: [id number]  Today's Date: 2018         Referring Physician: Herman      Admit Date: 2018     Visit Dx:    ICD-10-CM ICD-9-CM   1. Decreased mobility R26.89 781.99     Patient Active Problem List   Diagnosis   • Osteoarthritis, knee     Past Medical History:   Diagnosis Date   • Anxiety    • Arthritis    • Cancer     BREAST CANCER LUMPECTOMY 11 YRS AGO   • GERD (gastroesophageal reflux disease)    • Hypertension    • Knee pain, left      Past Surgical History:   Procedure Laterality Date   • BREAST LUMPECTOMY Left    • WRIST FRACTURE SURGERY Right     WITH PLATE          PT ASSESSMENT (last 72 hours)      PT Evaluation       18 1629 18 0924    Rehab Evaluation    Document Type evaluation  -MG     Subjective Information agree to therapy;complains of;nausea/vomiting;pain  -MG     Patient Effort, Rehab Treatment adequate  -MG     Symptoms Noted During/After Treatment fatigue;increased pain   nausea  -MG     General Information    Patient Profile Review yes  -MG     Referring Physician Herman  -MG     General Observations supine in bed, nauseous  -MG     Pertinent History Of Current Problem POD 0 L TKA  -MG     Precautions/Limitations fall precautions  -MG     Prior Level of Function independent:  -MG     Equipment Currently Used at Home none   ordered walker and commode  -MG none  -[facility]    Plans/Goals Discussed With patient;spouse/S.O.  -MG     Barriers to Rehab none identified  -MG     Living Environment    Lives With  spouse  -GH    Living Arrangements  house  -GH    Home Accessibility  bed and bath on same level  -GH    Stair Railings at Home  none  -GH    Type of Financial/Environmental Concern  none  -GH    Transportation Available  car;family or friend will provide  -GH    Living Environment Comment 3 steps to enter, lives with spouse who can help   -MG     Clinical Impression     Patient/Family Goals Statement return jamie   -MG     Criteria for Skilled Therapeutic Interventions Met yes;treatment indicated  -MG     Impairments Found (describe specific impairments) aerobic capacity/endurance;gait, locomotion, and balance;muscle performance;ROM  -MG     Rehab Potential good, to achieve stated therapy goals  -MG     Pain Assessment    Pain Assessment 0-10  -MG     Pain Score 7  -MG     Post Pain Score 8  -MG     Pain Type Acute pain;Surgical pain  -MG     Pain Location Knee  -MG     Pain Orientation Left  -MG     Pain Intervention(s) Repositioned;Ambulation/increased activity  -MG     Response to Interventions tolerated  -MG     Cognitive Assessment/Intervention    Current Cognitive/Communication Assessment functional  -MG     Orientation Status oriented x 4  -MG     Follows Commands/Answers Questions 100% of the time  -MG     Personal Safety WNL/WFL  -MG     Personal Safety Interventions fall prevention program maintained;gait belt  -MG     ROM (Range of Motion)    General ROM Detail 0-30 L knee   -MG     MMT (Manual Muscle Testing)    General MMT Assessment Detail decreased L knee strength post op   -MG     Bed Mobility, Assessment/Treatment    Bed Mobility, Comment unable to complete out of bed mobility due to nausea and vomiting. dry heaving throughout session   -MG     Therapy Exercises    Exercise Protocols total knee  -MG     Total Knee Exercises left:;10 reps;completed protocol  -MG       User Key  (r) = Recorded By, (t) = Taken By, (c) = Cosigned By    Initials Name Provider Type     Rebeca Pineda, RN Registered Nurse    MG Megan Gosselin, SEAMUS Physical Therapist          Physical Therapy Education     Title: PT OT SLP Therapies (Done)     Topic: Physical Therapy (Done)     Point: Mobility training (Done)    Learning Progress Summary    Learner Readiness Method Response Comment Documented by Status   Patient Acceptance CHRISTOPHER SYLVESTER  MG 01/11/18 0692 Done               Point: Home exercise  program (Done)    Learning Progress Summary    Learner Readiness Method Response Comment Documented by Status   Patient Acceptance E VU,NR  MG 01/11/18 1632 Done               Point: Body mechanics (Done)    Learning Progress Summary    Learner Readiness Method Response Comment Documented by Status   Patient Acceptance E VU,NR  MG 01/11/18 1632 Done               Point: Precautions (Done)    Learning Progress Summary    Learner Readiness Method Response Comment Documented by Status   Patient Acceptance E VU,NR  MG 01/11/18 1632 Done                      User Key     Initials Effective Dates Name Provider Type Discipline    MG 09/13/17 -  Megan Gosselin, PT Physical Therapist PT                PT Recommendation and Plan  Anticipated Discharge Disposition: home with home health  PT Frequency: 2 times/day  Plan of Care Review  Outcome Summary/Follow up Plan: Pt POD 0 L TKA. Pt previously independent. Ordered FWW and commode. Pt has decreased L knee ROM and strength. Increased nausea with mobility. Will see pt to improve independence. Anticipate d/c home with [facility] when medically able.           IP PT Goals       01/11/18 1633          Bed Mobility PT LTG    Bed Mobility PT LTG, Date Established 01/11/18  -MG      Bed Mobility PT LTG, Time to Achieve 4 days  -MG      Bed Mobility PT LTG, Activity Type all bed mobility  -MG      Bed Mobility PT LTG, [place] Level independent  -MG      Transfer Training PT LTG    Transfer Training PT LTG, Date Established 01/11/18  -MG      Transfer Training PT LTG, Time to Achieve 4 days  -MG      Transfer Training PT LTG, Activity Type bed to chair /chair to bed;sit to stand/stand to sit  -MG      Transfer Training PT LTG, [place] Level supervision required  -MG      Transfer Training PT LTG, Assist Device walker, rolling  -MG      Gait Training PT LTG    Gait Training Goal PT LTG, Date Established 01/11/18  -MG      Gait Training Goal PT LTG, Time to Achieve 4 days  -MG       Gait Training Goal PT LTG, [place] Level supervision required  -MG      Gait Training Goal PT LTG, Assist Device walker, rolling  -MG      Gait Training Goal PT LTG, Distance to Achieve 100  -MG      Stair Training PT LTG    Stair Training Goal PT LTG, Date Established 01/11/18  -MG      Stair Training Goal PT LTG, Time to Achieve 4 days  -MG      Stair Training Goal PT LTG, Number of Steps 3  -MG      Stair Training Goal PT LTG, [place] Level supervision required  -MG      Stair Training Goal PT LTG, Assist Device 1 handrail  -MG        User Key  (r) = Recorded By, (t) = Taken By, (c) = Cosigned By    Initials Name Provider Type    MG Megan Gosselin, PT Physical Therapist                Outcome Measures       01/11/18 1600          How much help from another person do you currently need...    Turning from your back to your side while in flat bed without using bedrails? 3  -MG      Moving from lying on back to sitting on the side of a flat bed without bedrails? 3  -MG      Moving to and from a bed to a chair (including a wheelchair)? 3  -MG      Standing up from a chair using your arms (e.g., wheelchair, bedside chair)? 3  -MG      Climbing 3-5 steps with a railing? 2  -MG      To walk in hospital room? 3  -MG      AM-PAC 6 Clicks Score 17  -MG      Functional Assessment    Outcome Measure Options AM-PAC 6 Clicks Basic Mobility (PT)  -MG        User Key  (r) = Recorded By, (t) = Taken By, (c) = Cosigned By    Initials Name Provider Type    MG Megan Gosselin, PT Physical Therapist           Time Calculation:         PT Charges       01/11/18 1635          Time Calculation    Start Time 1626  -MG      Stop Time 1638  -MG      Time Calculation (min) 12 min  -MG      PT Received On 01/11/18  -MG      PT - Next Appointment 01/12/18  -MG      PT Goal Re-Cert Due Date 01/15/18  -MG        User Key  (r) = Recorded By, (t) = Taken By, (c) = Cosigned By    Initials Name Provider Type    MG Megan Gosselin, PT  Physical Therapist          Therapy Charges for Today     Code Description Service Date Service Provider Modifiers Qty    29508768574 HC PT MOBILITY CURRENT 1/11/2018 Megan Gosselin, PT GP, CK 1    85718262401 HC PT MOBILITY PROJECTED 1/11/2018 Megan Gosselin, PT GP, CI 1    [id number] HC PT EVAL MOD COMPLEXITY 2 1/11/2018 Megan Gosselin, PT GP 1    60102306087 HC PT THER PROC EA 15 MIN 1/11/2018 Megan Gosselin, PT GP 1          PT G-Codes  PT Professional Judgement Used?: Yes  Outcome Measure Options: AM-PAC 6 Clicks Basic Mobility (PT)  Score: 17  Functional Limitation: Mobility: Walking and moving around  Mobility: Walking and Moving Around Current Status (): At least 40 percent but less than 60 percent impaired, limited or restricted  Mobility: Walking and Moving Around Goal Status (): At least 1 percent but less than 20 percent impaired, limited or restricted      Megan Gosselin, PT  1/11/2018

## 2018-01-11 NOTE — PLAN OF CARE
Problem: Patient Care Overview (Adult)  Goal: Plan of Care Review   01/11/18 1633   Outcome Evaluation   Outcome Summary/Follow up Plan Pt POD 0 L TKA. Pt previously independent. Ordered FWW and commode. Pt has decreased L knee ROM and strength. Increased nausea with mobility. Will see pt to improve independence. Anticipate d/c home with [facility] when medically able.        Problem: Inpatient Physical Therapy  Goal: Bed Mobility Goal LTG- PT   01/11/18 1633   Bed Mobility PT LTG   Bed Mobility PT LTG, Date Established 01/11/18   Bed Mobility PT LTG, Time to Achieve 4 days   Bed Mobility PT LTG, Activity Type all bed mobility   Bed Mobility PT LTG, [place] Level independent     Goal: Transfer Training Goal 1 LTG- PT   01/11/18 1633   Transfer Training PT LTG   Transfer Training PT LTG, Date Established 01/11/18   Transfer Training PT LTG, Time to Achieve 4 days   Transfer Training PT LTG, Activity Type bed to chair /chair to bed;sit to stand/stand to sit   Transfer Training PT LTG, [place] Level supervision required   Transfer Training PT LTG, Assist Device walker, rolling     Goal: Gait Training Goal LTG- PT   01/11/18 1633   Gait Training PT LTG   Gait Training Goal PT LTG, Date Established 01/11/18   Gait Training Goal PT LTG, Time to Achieve 4 days   Gait Training Goal PT LTG, [place] Level supervision required   Gait Training Goal PT LTG, Assist Device walker, rolling   Gait Training Goal PT LTG, Distance to Achieve 100     Goal: Stair Training Goal LTG- PT   01/11/18 1633   Stair Training PT LTG   Stair Training Goal PT LTG, Date Established 01/11/18   Stair Training Goal PT LTG, Time to Achieve 4 days   Stair Training Goal PT LTG, Number of Steps 3   Stair Training Goal PT LTG, [place] Level supervision required   Stair Training Goal PT LTG, Assist Device 1 handrail

## 2018-01-11 NOTE — PLAN OF CARE
Problem: Patient Care Overview (Adult)  Goal: Plan of Care Review  Outcome: Ongoing (interventions implemented as appropriate)   01/11/18 1015   Coping/Psychosocial Response Interventions   Plan Of Care Reviewed With patient   Patient Care Overview   Progress no change     Goal: Adult Individualization and Mutuality  Outcome: Ongoing (interventions implemented as appropriate)   01/11/18 1015   Individualization   Patient Specific Preferences None     Goal: Discharge Needs Assessment  Outcome: Ongoing (interventions implemented as appropriate)   01/11/18 1015   Discharge Needs Assessment   Concerns To Be Addressed no discharge needs identified

## 2018-01-12 PROBLEM — R26.89 DECREASED MOBILITY: Status: ACTIVE | Noted: 2018-01-12

## 2018-01-12 LAB
ANION GAP SERPL CALCULATED.3IONS-SCNC: 10.8 MMOL/L
BUN BLD-MCNC: 23 MG/DL (ref 8–23)
BUN/CREAT SERPL: 28.4 (ref 7–25)
CALCIUM SPEC-SCNC: 8.4 MG/DL (ref 8.6–10.5)
CHLORIDE SERPL-SCNC: 104 MMOL/L (ref 98–107)
CO2 SERPL-SCNC: 24.2 MMOL/L (ref 22–29)
CREAT BLD-MCNC: 0.81 MG/DL (ref 0.57–1)
GFR SERPL CREATININE-BSD FRML MDRD: 69 ML/MIN/1.73
GLUCOSE BLD-MCNC: 119 MG/DL (ref 65–99)
HCT VFR BLD AUTO: 35.4 % (ref 35.6–45.5)
HGB BLD-MCNC: 11.5 G/DL (ref 11.9–15.5)
POTASSIUM BLD-SCNC: 4.5 MMOL/L (ref 3.5–5.2)
SODIUM BLD-SCNC: 139 MMOL/L (ref 136–145)

## 2018-01-12 PROCEDURE — 80048 BASIC METABOLIC PNL TOTAL CA: CPT | Performed by: ORTHOPAEDIC SURGERY

## 2018-01-12 PROCEDURE — 63710000001 PANTOPRAZOLE 40 MG TABLET DELAYED-RELEASE: Performed by: ORTHOPAEDIC SURGERY

## 2018-01-12 PROCEDURE — 94799 UNLISTED PULMONARY SVC/PX: CPT

## 2018-01-12 PROCEDURE — 63710000001 ASPIRIN EC 325 MG TABLET DELAYED-RELEASE: Performed by: ORTHOPAEDIC SURGERY

## 2018-01-12 PROCEDURE — 63710000001 HYDROCODONE-ACETAMINOPHEN 5-325 MG TABLET: Performed by: ORTHOPAEDIC SURGERY

## 2018-01-12 PROCEDURE — A9270 NON-COVERED ITEM OR SERVICE: HCPCS | Performed by: ORTHOPAEDIC SURGERY

## 2018-01-12 PROCEDURE — 63710000001 TRAMADOL 50 MG TABLET: Performed by: ORTHOPAEDIC SURGERY

## 2018-01-12 PROCEDURE — 63710000001 TERAZOSIN 5 MG CAPSULE: Performed by: ORTHOPAEDIC SURGERY

## 2018-01-12 PROCEDURE — G0378 HOSPITAL OBSERVATION PER HR: HCPCS

## 2018-01-12 PROCEDURE — 25010000002 KETOROLAC TROMETHAMINE PER 15 MG: Performed by: ORTHOPAEDIC SURGERY

## 2018-01-12 PROCEDURE — 63710000001 POTASSIUM CHLORIDE 10 MEQ CAPSULE CONTROLLED-RELEASE: Performed by: ORTHOPAEDIC SURGERY

## 2018-01-12 PROCEDURE — 63710000001: Performed by: ORTHOPAEDIC SURGERY

## 2018-01-12 PROCEDURE — 97150 GROUP THERAPEUTIC PROCEDURES: CPT

## 2018-01-12 PROCEDURE — 85018 HEMOGLOBIN: CPT | Performed by: ORTHOPAEDIC SURGERY

## 2018-01-12 PROCEDURE — 25010000002 FONDAPARINUX PER 0.5 MG: Performed by: ORTHOPAEDIC SURGERY

## 2018-01-12 PROCEDURE — 97110 THERAPEUTIC EXERCISES: CPT

## 2018-01-12 PROCEDURE — 85014 HEMATOCRIT: CPT | Performed by: ORTHOPAEDIC SURGERY

## 2018-01-12 PROCEDURE — 25010000002 ONDANSETRON PER 1 MG: Performed by: ORTHOPAEDIC SURGERY

## 2018-01-12 PROCEDURE — 25010000002 HYDROMORPHONE PER 4 MG: Performed by: ORTHOPAEDIC SURGERY

## 2018-01-12 RX ORDER — HYDROCODONE BITARTRATE AND ACETAMINOPHEN 5; 325 MG/1; MG/1
2 TABLET ORAL EVERY 4 HOURS PRN
Status: DISCONTINUED | OUTPATIENT
Start: 2018-01-12 | End: 2018-01-13 | Stop reason: HOSPADM

## 2018-01-12 RX ORDER — TRAMADOL HYDROCHLORIDE 50 MG/1
50 TABLET ORAL EVERY 4 HOURS PRN
Status: DISCONTINUED | OUTPATIENT
Start: 2018-01-12 | End: 2018-01-13 | Stop reason: HOSPADM

## 2018-01-12 RX ORDER — FONDAPARINUX SODIUM 2.5 MG/.5ML
2.5 INJECTION SUBCUTANEOUS ONCE
Status: COMPLETED | OUTPATIENT
Start: 2018-01-12 | End: 2018-01-12

## 2018-01-12 RX ORDER — HYDROCODONE BITARTRATE AND ACETAMINOPHEN 5; 325 MG/1; MG/1
1 TABLET ORAL EVERY 4 HOURS PRN
Status: DISCONTINUED | OUTPATIENT
Start: 2018-01-12 | End: 2018-01-12

## 2018-01-12 RX ORDER — HYDROCODONE BITARTRATE AND ACETAMINOPHEN 5; 325 MG/1; MG/1
1 TABLET ORAL EVERY 4 HOURS PRN
Status: DISCONTINUED | OUTPATIENT
Start: 2018-01-12 | End: 2018-01-13 | Stop reason: HOSPADM

## 2018-01-12 RX ORDER — TRAMADOL HYDROCHLORIDE 50 MG/1
100 TABLET ORAL EVERY 4 HOURS PRN
Status: DISCONTINUED | OUTPATIENT
Start: 2018-01-12 | End: 2018-01-13 | Stop reason: HOSPADM

## 2018-01-12 RX ADMIN — HYDROCODONE BITARTRATE AND ACETAMINOPHEN 1 TABLET: 5; 325 TABLET ORAL at 23:36

## 2018-01-12 RX ADMIN — TERAZOSIN HYDROCHLORIDE 10 MG: 5 CAPSULE ORAL at 20:59

## 2018-01-12 RX ADMIN — ONDANSETRON 4 MG: 2 INJECTION INTRAMUSCULAR; INTRAVENOUS at 03:49

## 2018-01-12 RX ADMIN — HYDROCODONE BITARTRATE AND ACETAMINOPHEN 2 TABLET: 5; 325 TABLET ORAL at 15:13

## 2018-01-12 RX ADMIN — LISINOPRIL AND HYDROCHLOROTHIAZIDE 1 TABLET: 10; 12.5 TABLET ORAL at 08:12

## 2018-01-12 RX ADMIN — ASPIRIN 325 MG: 325 TABLET, COATED ORAL at 08:12

## 2018-01-12 RX ADMIN — KETOROLAC TROMETHAMINE 15 MG: 30 INJECTION, SOLUTION INTRAMUSCULAR at 01:20

## 2018-01-12 RX ADMIN — KETOROLAC TROMETHAMINE 15 MG: 30 INJECTION, SOLUTION INTRAMUSCULAR at 06:39

## 2018-01-12 RX ADMIN — HYDROMORPHONE HYDROCHLORIDE 1 MG: 1 INJECTION, SOLUTION INTRAMUSCULAR; INTRAVENOUS; SUBCUTANEOUS at 13:04

## 2018-01-12 RX ADMIN — HYDROCODONE BITARTRATE AND ACETAMINOPHEN 2 TABLET: 5; 325 TABLET ORAL at 19:34

## 2018-01-12 RX ADMIN — PANTOPRAZOLE SODIUM 40 MG: 40 TABLET, DELAYED RELEASE ORAL at 06:39

## 2018-01-12 RX ADMIN — TRAMADOL HYDROCHLORIDE 100 MG: 50 TABLET, FILM COATED ORAL at 09:18

## 2018-01-12 RX ADMIN — CEFAZOLIN SODIUM 2 G: 10 INJECTION, POWDER, FOR SOLUTION INTRAVENOUS at 03:07

## 2018-01-12 RX ADMIN — FONDAPARINUX SODIUM 2.5 MG: 2.5 INJECTION, SOLUTION SUBCUTANEOUS at 08:12

## 2018-01-12 RX ADMIN — POTASSIUM CHLORIDE 20 MEQ: 750 CAPSULE, EXTENDED RELEASE ORAL at 08:12

## 2018-01-12 NOTE — THERAPY TREATMENT NOTE
Acute Care - Physical Therapy Treatment Note  [facility]     Patient Name: Liseth Flores  : 1945  MRN: [id number]  Today's Date: 2018        Referring Physician: Herman    Admmehnaz Date: 2018    Visit Dx:    ICD-10-CM ICD-9-CM   1. Decreased mobility R26.89 781.99     Patient Active Problem List   Diagnosis   • Osteoarthritis, knee               Adult Rehabilitation Note       18 1200          Rehab Assessment/Intervention    Discipline physical therapy assistant  -      Document Type therapy note (daily note)  -      Subjective Information agree to therapy;complains of;fatigue;pain;swelling;nausea/vomiting  -      Symptoms Noted During/After Treatment fatigue;increased pain  -      Precautions/Limitations fall precautions  -      Recorded by [] Lea Ramirez PTA      Pain Assessment    Pain Assessment 0-10  -      Pain Score 8  -JM      Pain Type Surgical pain  -      Pain Location Knee  -      Pain Orientation Left  -      Pain Intervention(s) Medication (See MAR);Repositioned;Cold applied  -      Response to Interventions emilio  -JM      Recorded by [] Lea Ramirez PTA      ROM (Range of Motion)    General ROM Detail -6-90  -      Recorded by [] Lea Ramirez PTA      Bed Mobility, Assessment/Treatment    Bed Mobility, Comment in chair  -JM      Recorded by [] Lea Ramirez PTA      Transfer Assessment/Treatment    Transfers, Sit-Stand [place] minimum assist (75% patient effort);verbal cues required;nonverbal cues required (demo/gesture)   2 attempts req  -      Recorded by [] Lea Ramirez PTA      Gait Assessment/Treatment    Gait, [place] Level contact guard assist;verbal cues required  -      Gait, Assistive Device rolling walker  -      Gait, Distance (Feet) 20  -      Gait, Safety Issues step length decreased;weight-shifting ability decreased;balance decreased during turns  -      Gait, Impairments strength  decreased;impaired balance;pain  -JM      Gait, Comment nausea limiting dist  -JM      Recorded by [MK] Lea Ramirez PTA      Stairs Assessment/Treatment    Stairs, Comment try next session if pn allows  -JM      Recorded by [MK] Lea Ramirez PTA      Therapy Exercises    Exercise Protocols total knee  -JM      Total Knee Exercises left:;15 reps;completed protocol   cues for each  -JM      Recorded by [OMAR Ramirez PTA      Positioning and Restraints    Pre-Treatment Position sitting in chair/recliner  -JM      Post Treatment Position chair  -JM      In Chair reclined;with family/caregiver;encouraged to call for assist  -JM      Recorded by [MK] Lea Ramirez PTA        User Key  (r) = Recorded By, (t) = Taken By, (c) = Cosigned By    Initials Name Effective Dates    MK Ramirez PTA 02/18/16 -                 IP PT Goals       01/11/18 1633          Bed Mobility PT LTG    Bed Mobility PT LTG, Date Established 01/11/18  -MG      Bed Mobility PT LTG, Time to Achieve 4 days  -MG      Bed Mobility PT LTG, Activity Type all bed mobility  -MG      Bed Mobility PT LTG, [place] Level independent  -MG      Transfer Training PT LTG    Transfer Training PT LTG, Date Established 01/11/18  -MG      Transfer Training PT LTG, Time to Achieve 4 days  -MG      Transfer Training PT LTG, Activity Type bed to chair /chair to bed;sit to stand/stand to sit  -MG      Transfer Training PT LTG, [place] Level supervision required  -MG      Transfer Training PT LTG, Assist Device walker, rolling  -MG      Gait Training PT LTG    Gait Training Goal PT LTG, Date Established 01/11/18  -MG      Gait Training Goal PT LTG, Time to Achieve 4 days  -MG      Gait Training Goal PT LTG, [place] Level supervision required  -MG      Gait Training Goal PT LTG, Assist Device walker, rolling  -MG      Gait Training Goal PT LTG, Distance to Achieve 100  -MG      Stair Training PT LTG    Stair Training Goal PT LTG,  Date Established 01/11/18  -MG      Stair Training Goal PT LTG, Time to Achieve 4 days  -MG      Stair Training Goal PT LTG, Number of Steps 3  -MG      Stair Training Goal PT LTG, [place] Level supervision required  -MG      Stair Training Goal PT LTG, Assist Device 1 handrail  -MG        User Key  (r) = Recorded By, (t) = Taken By, (c) = Cosigned By    Initials Name Provider Type    MG Megan Gosselin, PT Physical Therapist          Physical Therapy Education     Title: PT OT SLP Therapies (Done)     Topic: Physical Therapy (Done)     Point: Mobility training (Done)    Learning Progress Summary    Learner Readiness Method Response Comment Documented by Status   Patient Acceptance E,TB,D VU,NR   01/12/18 1255 Done    Acceptance E VU,NR   01/11/18 1632 Done   Family Acceptance E,TB,D VU,NR   01/12/18 1255 Done               Point: Home exercise program (Done)    Learning Progress Summary    Learner Readiness Method Response Comment Documented by Status   Patient Acceptance E,TB,D VU,NR   01/12/18 1255 Done    Acceptance E VU,NR   01/11/18 1632 Done   Family Acceptance E,TB,D VU,NR   01/12/18 1255 Done               Point: Body mechanics (Done)    Learning Progress Summary    Learner Readiness Method Response Comment Documented by Status   Patient Acceptance E,TB,D VU,NR   01/12/18 1255 Done    Acceptance E VU,NR  MG 01/11/18 1632 Done   Family Acceptance E,TB,D VU,NR   01/12/18 1255 Done               Point: Precautions (Done)    Learning Progress Summary    Learner Readiness Method Response Comment Documented by Status   Patient Acceptance E,TB,D VU,NR   01/12/18 1255 Done    Acceptance E VU,NR  MG 01/11/18 1632 Done   Family Acceptance E,TB,D VU,NR   01/12/18 1255 Done                      User Key     Initials Effective Dates Name Provider Type Discipline     02/18/16 -  Lea Ramirez, PTA Physical Therapy Assistant PT     09/13/17 -  Megan Gosselin, PT Physical Therapist PT                     PT Recommendation and Plan  Anticipated Discharge Disposition: home with home health  PT Frequency: 2 times/day  Plan of Care Review  Plan Of Care Reviewed With: patient, spouse  Progress: improving  Outcome Summary/Follow up Plan: able to perf exer indep, incr amb dist          Outcome Measures       01/12/18 1200 01/11/18 1600       How much help from another person do you currently need...    Turning from your back to your side while in flat bed without using bedrails? 3  -JM 3  -MG     Moving from lying on back to sitting on the side of a flat bed without bedrails? 3  -JM 3  -MG     Moving to and from a bed to a chair (including a wheelchair)? 3  -JM 3  -MG     Standing up from a chair using your arms (e.g., wheelchair, bedside chair)? 3  -JM 3  -MG     Climbing 3-5 steps with a railing? 2  -JM 2  -MG     To walk in hospital room? 3  -JM 3  -MG     AM-PAC 6 Clicks Score 17  - 17  -MG     Functional Assessment    Outcome Measure Options  AM-PAC 6 Clicks Basic Mobility (PT)  -       User Key  (r) = Recorded By, (t) = Taken By, (c) = Cosigned By    Initials Name Provider Type    MK Ramirez PTA Physical Therapy Assistant    MG Megan Gosselin, PT Physical Therapist           Time Calculation:         PT Charges       01/12/18 1256          Time Calculation    Start Time 1030  -      Stop Time 1115  -      Time Calculation (min) 45 min  -      PT Received On 01/12/18  -      PT - Next Appointment 01/12/18  -        User Key  (r) = Recorded By, (t) = Taken By, (c) = Cosigned By    Initials Name Provider Type    MK Ramirez PTA Physical Therapy Assistant              PT G-Codes  PT Professional Judgement Used?: Yes  Outcome Measure Options: AM-PAC 6 Clicks Basic Mobility (PT)  Score: 17  Functional Limitation: Mobility: Walking and moving around  Mobility: Walking and Moving Around Current Status (): At least 40 percent but less than 60 percent impaired, limited or  restricted  Mobility: Walking and Moving Around Goal Status (): At least 1 percent but less than 20 percent impaired, limited or restricted    Lea Ramirez, PTA  1/12/2018

## 2018-01-12 NOTE — THERAPY TREATMENT NOTE
Acute Care - Physical Therapy Treatment Note  [facility]     Patient Name: Liseth Flores  : 1945  MRN: [id number]  Today's Date: 2018        Referring Physician: Herman    Admmehnaz Date: 2018    Visit Dx:    ICD-10-CM ICD-9-CM   1. Decreased mobility R26.89 781.99     Patient Active Problem List   Diagnosis   • Osteoarthritis, knee   • Decreased mobility               Adult Rehabilitation Note       18 1540 18 1200       Rehab Assessment/Intervention    Discipline physical therapy assistant  - physical therapy assistant  -     Document Type therapy note (daily note)  - therapy note (daily note)  -     Subjective Information agree to therapy;complains of;weakness;fatigue;pain  - agree to therapy;complains of;fatigue;pain;swelling;nausea/vomiting  -     Symptoms Noted During/After Treatment  fatigue;increased pain  -     Precautions/Limitations fall precautions  - fall precautions  -     Recorded by [] Lea Ramirez PTA [] Lea Ramirez PTA     Pain Assessment    Pain Assessment 0-10  - 0-10  -JM     Pain Score 8  -JM 8  -JM     Pain Type Surgical pain  - Surgical pain  -     Pain Location Knee  - Knee  -     Pain Orientation Left  -JM Left  -JM     Pain Intervention(s) Medication (See MAR);Repositioned;Cold applied  - Medication (See MAR);Repositioned;Cold applied  -     Response to Interventions emilio, unchanged  - emilio  -JM     Recorded by [JM] Lea Ramirez PTA [JM] Lea Ramirez PTA     ROM (Range of Motion)    General ROM Detail  -6-90  -     Recorded by  [MK] Lea Ramirez PTA     Bed Mobility, Assessment/Treatment    Bed Mobility, Comment  in chair  -     Recorded by  [MK] Lea Ramirez PTA     Transfer Assessment/Treatment    Transfers, Sit-Stand [place] minimum assist (75% patient effort);contact guard assist;verbal cues required;nonverbal cues required (demo/gesture)  - minimum assist (75% patient effort);verbal  cues required;nonverbal cues required (demo/gesture)   2 attempts req  -JM     Transfers, Stand-Sit [place] contact guard assist;verbal cues required  -JM contact guard assist;verbal cues required  -JM     Transfers, Sit-Stand-Sit, Assist Device rolling walker  -JM rolling walker  -JM     Recorded by [MK] Lea Ramirez PTA [JM] Lea Ramirez PTA     Gait Assessment/Treatment    Gait, [place] Level contact guard assist;verbal cues required  -JM contact guard assist;verbal cues required  -JM     Gait, Assistive Device rolling walker  -JM rolling walker  -JM     Gait, Distance (Feet) 20  -JM 20  -JM     Gait, Safety Issues balance decreased during turns;step length decreased;weight-shifting ability decreased  - step length decreased;weight-shifting ability decreased;balance decreased during turns  -     Gait, Impairments strength decreased;impaired balance;pain  - strength decreased;impaired balance;pain  -     Gait, Comment decr endurance limiting dist  -JM nausea limiting dist  -JM     Recorded by [MK] Lea Ramirez PTA [JM] Lea Ramirez PTA     Stairs Assessment/Treatment    Number of Stairs 4  -JM      Stairs, Handrail Location right side (ascending)  -      Stairs, [place] Level contact guard assist;verbal cues required  -      Stairs, Technique Used step to step (ascending);step to step (descending)  -      Stairs, Comment fam present for educ  -JM try next session if pn allows  -JM     Recorded by [MK] Lea Ramirez PTA [JM] Lea Ramirez PTA     Therapy Exercises    Exercise Protocols total knee  -JM total knee  -JM     Total Knee Exercises left:;completed protocol;20 reps   cues for each  -JM left:;15 reps;completed protocol   cues for each  -JM     Recorded by [JM] Lea Ramirez PTA [JM] Lea Ramirez PTA     Positioning and Restraints    Pre-Treatment Position sitting in chair/recliner  -JM sitting in chair/recliner  -JM     Post Treatment Position  chair   -JM     In Chair reclined;with family/caregiver;encouraged to call for assist  -JM reclined;with family/caregiver;encouraged to call for assist  -JM     Recorded by [MK] Lea Ramirez PTA [MK] Lea Ramirez PTA       User Key  (r) = Recorded By, (t) = Taken By, (c) = Cosigned By    Initials Name Effective Dates    MK Ramirez PTA 02/18/16 -                 IP PT Goals       01/11/18 1633          Bed Mobility PT LTG    Bed Mobility PT LTG, Date Established 01/11/18  -MG      Bed Mobility PT LTG, Time to Achieve 4 days  -MG      Bed Mobility PT LTG, Activity Type all bed mobility  -MG      Bed Mobility PT LTG, [place] Level independent  -MG      Transfer Training PT LTG    Transfer Training PT LTG, Date Established 01/11/18  -MG      Transfer Training PT LTG, Time to Achieve 4 days  -MG      Transfer Training PT LTG, Activity Type bed to chair /chair to bed;sit to stand/stand to sit  -MG      Transfer Training PT LTG, [place] Level supervision required  -MG      Transfer Training PT LTG, Assist Device walker, rolling  -MG      Gait Training PT LTG    Gait Training Goal PT LTG, Date Established 01/11/18  -MG      Gait Training Goal PT LTG, Time to Achieve 4 days  -MG      Gait Training Goal PT LTG, [place] Level supervision required  -MG      Gait Training Goal PT LTG, Assist Device walker, rolling  -MG      Gait Training Goal PT LTG, Distance to Achieve 100  -MG      Stair Training PT LTG    Stair Training Goal PT LTG, Date Established 01/11/18  -MG      Stair Training Goal PT LTG, Time to Achieve 4 days  -MG      Stair Training Goal PT LTG, Number of Steps 3  -MG      Stair Training Goal PT LTG, [place] Level supervision required  -MG      Stair Training Goal PT LTG, Assist Device 1 handrail  -MG        User Key  (r) = Recorded By, (t) = Taken By, (c) = Cosigned By    Initials Name Provider Type    MG Megan Gosselin, PT Physical Therapist          Physical Therapy Education      Title: PT OT SLP Therapies (Done)     Topic: Physical Therapy (Done)     Point: Mobility training (Done)    Learning Progress Summary    Learner Readiness Method Response Comment Documented by Status   Patient Acceptance E,TB,D VU,NR   01/12/18 1255 Done    Acceptance E VU,NR  MG 01/11/18 1632 Done   Family Acceptance E,TB,D VU,NR   01/12/18 1255 Done               Point: Home exercise program (Done)    Learning Progress Summary    Learner Readiness Method Response Comment Documented by Status   Patient Acceptance E,TB,D VU,NR   01/12/18 1255 Done    Acceptance E VU,NR  MG 01/11/18 1632 Done   Family Acceptance E,TB,D VU,NR   01/12/18 1255 Done               Point: Body mechanics (Done)    Learning Progress Summary    Learner Readiness Method Response Comment Documented by Status   Patient Acceptance E,TB,D VU,NR   01/12/18 1255 Done    Acceptance E VU,NR  MG 01/11/18 1632 Done   Family Acceptance E,TB,D VU,NR   01/12/18 1255 Done               Point: Precautions (Done)    Learning Progress Summary    Learner Readiness Method Response Comment Documented by Status   Patient Acceptance E,TB,D VU,NR   01/12/18 1255 Done    Acceptance E VU,NR  MG 01/11/18 1632 Done   Family Acceptance E,TB,D VU,NR   01/12/18 1255 Done                      User Key     Initials Effective Dates Name Provider Type Discipline     02/18/16 -  Lea Ramirez, PTA Physical Therapy Assistant PT     09/13/17 -  Megan Gosselin, PT Physical Therapist PT                    PT Recommendation and Plan  Anticipated Discharge Disposition: home with home health  PT Frequency: 2 times/day  Plan of Care Review  Plan Of Care Reviewed With: patient, spouse  Progress: improving  Outcome Summary/Follow up Plan: able to perf exer indep, incr amb dist          Outcome Measures       01/12/18 1200 01/11/18 1600       How much help from another person do you currently need...    Turning from your back to your side while in flat bed without  using bedrails? 3  -JM 3  -MG     Moving from lying on back to sitting on the side of a flat bed without bedrails? 3  -JM 3  -MG     Moving to and from a bed to a chair (including a wheelchair)? 3  -JM 3  -MG     Standing up from a chair using your arms (e.g., wheelchair, bedside chair)? 3  -JM 3  -MG     Climbing 3-5 steps with a railing? 2  -JM 2  -MG     To walk in hospital room? 3  -JM 3  -MG     AM-PAC 6 Clicks Score 17  -JM 17  -MG     Functional Assessment    Outcome Measure Options  AM-PAC 6 Clicks Basic Mobility (PT)  -MG       User Key  (r) = Recorded By, (t) = Taken By, (c) = Cosigned By    Initials Name Provider Type    MK Ramirez PTA Physical Therapy Assistant    MG Megan Gosselin, PT Physical Therapist           Time Calculation:         PT Charges       01/12/18 1618 01/12/18 1256       Time Calculation    Start Time 1400  - 1030  -     Stop Time 1445  - 1115  -     Time Calculation (min) 45 min  - 45 min  -     PT Received On 01/12/18  - 01/12/18  -MK     PT - Next Appointment 01/13/18  - 01/12/18  -       User Key  (r) = Recorded By, (t) = Taken By, (c) = Cosigned By    Initials Name Provider Type    MK Ramirez PTA Physical Therapy Assistant          Therapy Charges for Today     Code Description Service Date Service Provider Modifiers Qty    46418135970 HC PT THER PROC EA 15 MIN 1/12/2018 Lea Ramirez PTA GP 1    66968133624 HC PT THER PROC GROUP 1/12/2018 Lea Ramirez PTA GP 1          PT G-Codes  PT Professional Judgement Used?: Yes  Outcome Measure Options: AM-PAC 6 Clicks Basic Mobility (PT)  Score: 17  Functional Limitation: Mobility: Walking and moving around  Mobility: Walking and Moving Around Current Status (): At least 40 percent but less than 60 percent impaired, limited or restricted  Mobility: Walking and Moving Around Goal Status (): At least 1 percent but less than 20 percent impaired, limited or restricted    Lea Ramirez  PTA  1/12/2018

## 2018-01-12 NOTE — PLAN OF CARE
Problem: Patient Care Overview (Adult)  Goal: Plan of Care Review  Outcome: Ongoing (interventions implemented as appropriate)   01/12/18 0604   Coping/Psychosocial Response Interventions   Plan Of Care Reviewed With patient   Outcome Evaluation   Outcome Summary/Follow up Plan adequate pain control with toradol, bedside commode, still with nausea, emesis x2, discussed monitoring blood pressure before taking blood pressure meds.     Goal: Adult Individualization and Mutuality  Outcome: Ongoing (interventions implemented as appropriate)    Goal: Discharge Needs Assessment  Outcome: Ongoing (interventions implemented as appropriate)      Problem: Perioperative Period (Adult)  Goal: Signs and Symptoms of Listed Potential Problems Will be Absent or Manageable (Perioperative Period)  Outcome: Ongoing (interventions implemented as appropriate)      Problem: Knee Replacement, Total (Adult)  Goal: Signs and Symptoms of Listed Potential Problems Will be Absent or Manageable (Knee Replacement, Total)  Outcome: Ongoing (interventions implemented as appropriate)      Problem: Fall Risk (Adult)  Goal: Absence of Falls  Outcome: Ongoing (interventions implemented as appropriate)

## 2018-01-12 NOTE — PLAN OF CARE
"Problem: Patient Care Overview (Adult)  Goal: Plan of Care Review  Outcome: Ongoing (interventions implemented as appropriate)   01/12/18 1639   Coping/Psychosocial Response Interventions   Plan Of Care Reviewed With patient   Patient Care Overview   Progress improving   Outcome Evaluation   Outcome Summary/Follow up Plan Patient is ambulating well using walker and stand by assist. Vitals are stable and voiding function is intact. Patient is having more pain today, but is better controlled after changing pain medication. x1 dose of IM dilaudid given. Patient's nausea has mostly resolved, patient c/o intermittent \"queasiness\", no meds needed. Patient educated on bp monitoring and med compliance. Patient anticipates d/c home with  tomorrow.      Goal: Adult Individualization and Mutuality  Outcome: Ongoing (interventions implemented as appropriate)   01/11/18 2021 01/12/18 0604   Individualization   Patient Specific Preferences Goes by Tony --    Patient Specific Goals --  adequate pain control, increase ambulation, alleviate nausea   Patient Specific Interventions --  offer pain medication as needed, zofran as needed, assist with ambulation     Goal: Discharge Needs Assessment  Outcome: Ongoing (interventions implemented as appropriate)   01/12/18 1435   Discharge Needs Assessment   Discharge Facility/Level Of Care Needs home with home health   Living Environment   Transportation Available car;family or friend will provide       Problem: Perioperative Period (Adult)  Goal: Signs and Symptoms of Listed Potential Problems Will be Absent or Manageable (Perioperative Period)  Outcome: Outcome(s) achieved Date Met: 01/12/18 01/12/18 1639   Perioperative Period   Problems Assessed (Perioperative Period) all   Problems Present (Perioperative Period) pain       Problem: Knee Replacement, Total (Adult)  Goal: Signs and Symptoms of Listed Potential Problems Will be Absent or Manageable (Knee Replacement, Total)  Outcome: " Ongoing (interventions implemented as appropriate)   01/12/18 1639   Knee Replacement, Total   Problems Assessed (Total Knee Replacement) all   Problems Present (Total Knee Replacement) pain;functional decline/self care deficit;decreased range of motion       Problem: Fall Risk (Adult)  Goal: Absence of Falls  Outcome: Ongoing (interventions implemented as appropriate)   01/12/18 1639   Fall Risk (Adult)   Absence of Falls achieves outcome

## 2018-01-12 NOTE — PROGRESS NOTES
Discharge Planning Assessment  [facility]     Patient Name: Liseth Flores  MRN: [id number]  Today's Date: 1/12/2018    Admit Date: 1/11/2018          Discharge Needs Assessment       01/12/18 1435    Living Environment    Lives With spouse    Living Arrangements house    Home Accessibility no concerns    Stair Railings at Home none    Type of Financial/Environmental Concern none    Transportation Available car;family or friend will provide    Living Environment    Quality Of Family Relationships involved;supportive;helpful    Able to Return to Prior Living Arrangements yes    Discharge Needs Assessment    Concerns To Be Addressed no discharge needs identified;denies needs/concerns at this time    Readmission Within The Last 30 Days no previous admission in last 30 days    Equipment Currently Used at Home none    Discharge Facility/Level Of Care Needs home with home health            Discharge Plan       01/12/18 1427    Case Management/Social Work Plan    Plan Home w/ spouse and [facility].    Patient/Family In Agreement With Plan yes    Additional Comments S/w pt's spouse verified facesheet and d/c plan. Pt plans to d/c home w/ the help of her spouse and [facility]. Alina/Stephanie notified and will accept.         Discharge Placement     Facility/Agency Request Status Selected? Address Phone Number Fax Number    [facility] Accepted    Yes [address] , [address], [facility] [id number] [phone]        Alejandra Elise RN 1/12/2018 13:58    1/12/2018  [facility] notified.                            Demographic Summary     None            Functional Status       01/12/18 1437    Functional Status Current    Ambulation 3-->assistive equipment and person    Transferring 3-->assistive equipment and person    Toileting 3-->assistive equipment and person    Bathing 2-->assistive person    Dressing 2-->assistive person    Eating 0-->independent    Communication  0-->understands/communicates without difficulty    Swallowing (if score 2 or more for any item, consult Rehab Services) 0-->swallows foods/liquids without difficulty    Change in Functional Status Since Onset of Current Illness/Injury yes    Functional Status Prior    Ambulation 0-->independent    Transferring 0-->independent    Toileting 0-->independent    Bathing 0-->independent    Dressing 0-->independent    Eating 0-->independent    Communication 0-->understands/communicates without difficulty    Swallowing 0-->swallows foods/liquids without difficulty    IADL    Medications independent    Meal Preparation independent    Housekeeping independent    Laundry independent    Shopping independent    Oral Care independent    Activity Tolerance    Usual Activity Tolerance good    Current Activity Tolerance fair            Psychosocial     None            Abuse/Neglect     None            Legal     None            Substance Abuse     None            Patient Forms       01/12/18 3125    Patient Forms    Provider Choice List Delivered    Delivered to Patient    Method of delivery In person    Patient Observation Letter Delivered    Delivered to Patient   placed in HIM for pick-up     Method of delivery In person          Alejandra Elise RN

## 2018-01-12 NOTE — PLAN OF CARE
Problem: Patient Care Overview (Adult)  Goal: Plan of Care Review  Outcome: Ongoing (interventions implemented as appropriate)   01/12/18 1254   Coping/Psychosocial Response Interventions   Plan Of Care Reviewed With patient;spouse   Patient Care Overview   Progress improving   Outcome Evaluation   Outcome Summary/Follow up Plan able to perf exer indep, incr amb dist

## 2018-01-12 NOTE — PLAN OF CARE
Problem: Patient Care Overview (Adult)  Goal: Plan of Care Review  Outcome: Ongoing (interventions implemented as appropriate)   01/11/18 2021   Coping/Psychosocial Response Interventions   Plan Of Care Reviewed With patient   Patient Care Overview   Progress improving   Outcome Evaluation   Outcome Summary/Follow up Plan good pain control so far, PONV-medicated with zofran but still nauseated, dtv 2030, VSS, educated on bp meds and monitoring      Goal: Adult Individualization and Mutuality  Outcome: Ongoing (interventions implemented as appropriate)      Problem: Perioperative Period (Adult)  Goal: Signs and Symptoms of Listed Potential Problems Will be Absent or Manageable (Perioperative Period)  Outcome: Ongoing (interventions implemented as appropriate)      Problem: Knee Replacement, Total (Adult)  Goal: Signs and Symptoms of Listed Potential Problems Will be Absent or Manageable (Knee Replacement, Total)  Outcome: Ongoing (interventions implemented as appropriate)      Problem: Fall Risk (Adult)  Goal: Identify Related Risk Factors and Signs and Symptoms  Outcome: Outcome(s) achieved Date Met: 01/11/18    Goal: Absence of Falls  Outcome: Ongoing (interventions implemented as appropriate)

## 2018-01-12 NOTE — DISCHARGE PLACEMENT REQUEST
"Liseth Pichardo (72 y.o. Female)     Date of Birth Social Security Number Address Home Phone MRN    1945  [address] [place] [address] [phone] [id number]    Caodaism Marital Status          None        Admission Date Admission Type Admitting Provider Attending Provider Department, Room/Bed    1/11/18 Elective Philipp Sutton MD Sweet, Richard A, MD [address], 97/1    Discharge Date Discharge Disposition Discharge Destination                      Attending Provider: Philipp Sutton MD     Allergies:  No Known Allergies    Isolation:  None   Infection:  None   Code Status:  FULL    Ht:  162.6 cm (64\")   Wt:  93.8 kg (206 lb 12.8 oz)    Admission Cmt:  None   Principal Problem:  None                Active Insurance as of 1/11/2018     Primary Coverage     Payor Plan Insurance Group Employer/Plan Group    MEDICARE MEDICARE A & B      Payor Plan Address Payor Plan Phone Number Effective From Effective To    [address] [phone] 9/1/2008     [place] [address]       Subscriber Name Subscriber Birth Date Member ID       LISETH PICHARDO 1945 [id number]           Secondary Coverage     Payor Plan Insurance Group Employer/Plan Group    [facility] SUPP [id number]     Payor Plan Address Payor Plan Phone Number Effective From Effective To    [address]  12/1/2016     [place] [address]       Subscriber Name Subscriber Birth Date Member ID       LISETH PICHARDO 1945 [id number]                 Emergency Contacts      (Rel.) Home Phone Work Phone Mobile Phone    MarkGold (Spouse) -- -- [phone]            "

## 2018-01-12 NOTE — PROGRESS NOTES
"/64 (BP Location: Left arm, Patient Position: Lying)  Pulse 66  Temp 97.8 °F (36.6 °C) (Oral)   Resp 18  Ht 162.6 cm (64\")  Wt 93.8 kg (206 lb 12.8 oz)  SpO2 97%  BMI 35.5 kg/m2    Lab Results (last 24 hours)     Procedure Component Value Units Date/Time    Hemoglobin & Hematocrit, Blood [[id number]]  (Abnormal) Collected:  01/12/18 0332    Specimen:  Blood Updated:  01/12/18 0411     Hemoglobin 11.5 (L) g/dL      Hematocrit 35.4 (L) %     Basic Metabolic Panel [[id number]]  (Abnormal) Collected:  01/12/18 0332    Specimen:  Blood Updated:  01/12/18 0434     Glucose 119 (H) mg/dL      BUN 23 mg/dL      Creatinine 0.81 mg/dL      Sodium 139 mmol/L      Potassium 4.5 mmol/L      Chloride 104 mmol/L      CO2 24.2 mmol/L      Calcium 8.4 (L) mg/dL      eGFR Non African Amer 69 mL/min/1.73      BUN/Creatinine Ratio 28.4 (H)     Anion Gap 10.8 mmol/L     Narrative:       The MDRD GFR formula is only valid for adults with stable renal function between ages 18 and 70.          Imaging Results (last 24 hours)     Procedure Component Value Units Date/Time    XR Knee 1 or 2 View Left [[id number]] Collected:  01/11/18 1324     Updated:  01/11/18 1409    Narrative:       2-VIEW LEFT KNEE     HISTORY: Knee replacement for osteoarthritis.     FINDINGS: The patient has had recent total knee replacement and the  alignment appears satisfactory.     This report was finalized on 1/11/2018 2:06 PM by Dr. Arthur Childress MD.             Patient Care Team:  Anthony Mace MD as PCP - General (Internal Medicine)    SUBJECTIVE  Quite a bit of nausea  comfortable  PHYSICAL EXAM   NV intact  Active Problems:    Osteoarthritis, knee      PLAN / DISPOSITION:  Arixtra one time dose  Tramadol instead of narcotics  HH discharge in am  Philipp Sutton MD  01/12/18  7:37 AM      "

## 2018-01-13 VITALS
DIASTOLIC BLOOD PRESSURE: 69 MMHG | HEIGHT: 64 IN | WEIGHT: 206.8 LBS | RESPIRATION RATE: 16 BRPM | SYSTOLIC BLOOD PRESSURE: 115 MMHG | BODY MASS INDEX: 35.3 KG/M2 | OXYGEN SATURATION: 94 % | HEART RATE: 71 BPM | TEMPERATURE: 97.9 F

## 2018-01-13 PROCEDURE — 63710000001 POTASSIUM CHLORIDE 10 MEQ CAPSULE CONTROLLED-RELEASE: Performed by: ORTHOPAEDIC SURGERY

## 2018-01-13 PROCEDURE — 63710000001 ASPIRIN EC 325 MG TABLET DELAYED-RELEASE: Performed by: ORTHOPAEDIC SURGERY

## 2018-01-13 PROCEDURE — 97150 GROUP THERAPEUTIC PROCEDURES: CPT

## 2018-01-13 PROCEDURE — 63710000001 PANTOPRAZOLE 40 MG TABLET DELAYED-RELEASE: Performed by: ORTHOPAEDIC SURGERY

## 2018-01-13 PROCEDURE — 97110 THERAPEUTIC EXERCISES: CPT

## 2018-01-13 PROCEDURE — A9270 NON-COVERED ITEM OR SERVICE: HCPCS | Performed by: ORTHOPAEDIC SURGERY

## 2018-01-13 PROCEDURE — G0378 HOSPITAL OBSERVATION PER HR: HCPCS

## 2018-01-13 PROCEDURE — 63710000001 HYDROCODONE-ACETAMINOPHEN 5-325 MG TABLET: Performed by: ORTHOPAEDIC SURGERY

## 2018-01-13 PROCEDURE — 63710000001 FERROUS SULFATE 325 (65 FE) MG TABLET: Performed by: ORTHOPAEDIC SURGERY

## 2018-01-13 RX ORDER — TRAMADOL HYDROCHLORIDE 50 MG/1
50-100 TABLET ORAL EVERY 4 HOURS PRN
Qty: 60 TABLET | Refills: 1 | Status: SHIPPED | OUTPATIENT
Start: 2018-01-13 | End: 2018-01-22

## 2018-01-13 RX ADMIN — FERROUS SULFATE TAB 325 MG (65 MG ELEMENTAL FE) 325 MG: 325 (65 FE) TAB at 08:20

## 2018-01-13 RX ADMIN — PANTOPRAZOLE SODIUM 40 MG: 40 TABLET, DELAYED RELEASE ORAL at 08:20

## 2018-01-13 RX ADMIN — ASPIRIN 325 MG: 325 TABLET, COATED ORAL at 08:20

## 2018-01-13 RX ADMIN — HYDROCODONE BITARTRATE AND ACETAMINOPHEN 2 TABLET: 5; 325 TABLET ORAL at 12:55

## 2018-01-13 RX ADMIN — HYDROCODONE BITARTRATE AND ACETAMINOPHEN 2 TABLET: 5; 325 TABLET ORAL at 04:24

## 2018-01-13 RX ADMIN — POTASSIUM CHLORIDE 20 MEQ: 750 CAPSULE, EXTENDED RELEASE ORAL at 08:19

## 2018-01-13 RX ADMIN — HYDROCODONE BITARTRATE AND ACETAMINOPHEN 2 TABLET: 5; 325 TABLET ORAL at 08:20

## 2018-01-13 NOTE — DISCHARGE SUMMARY
Discharge Summary   Philipp Sutton M.D.    NAME: Liseth Flores ADMIT: 2018   : 1945  PCP: Anthony Mace MD    MRN: [id number] LOS: 1 days   AGE/SEX: 72 y.o. female  ROOM: P797/1       Date of Discharge:  18    Primary Discharge Diagnosis:  Osteoarthritis, knee [M17.10]  Decreased mobility [R26.89]    Secondary Discharge Diagnosis:    Problem List Items Addressed This Visit     Decreased mobility - Primary    Relevant Orders    Walker    Commode Chair    Referral to home health    Activity as tolerated    Discharge dressing/ wound instructions          Procedures Performed:  Left Total Knee Arthroplasty    Hospital Course:    Liseth Flores is a 72 y.o.  female who underwent successful Left Total Knee Arthroplasty on 2018.  Liseth Flores was started on Aspirin 325mg po daily immediately post-operatively for DVT prophylaxis.  On post-op day 1 the patients dressing was changed, drain removed and their incision was clean, with no signs of infection and their calf was soft, with no signs of DVT.  The patient progressed well with physical therapy and the patients hemoglobin remained stable. On post-operative day 2 the patient was felt ready for discharge.      Total Knee Joint Replacement Discharge Instructions:    I. ACTIVITIES:    1. Exercises:  ? Complete exercise program as taught by the hospital physical therapist 2 times per day  ? Exercise program will be advanced by the physical therapist  ? During the day be up ambulating every 2 hours (while awake) for short distances  ? Complete the ankle pump exercises at least 10 times per hour (while awake)  ? Elevate legs most of the day the first week post operatively and thereafter elevate legs when in bed and for at least 30 minutes during the day. Caution must be taken to avoid pillow placement under the bend of the knee as this can led to flexion contractures of the knee.  ? Use cold packs 20-30 minutes approximately 5 times per day.  This should be done before and after completing your exercises and at any time you are experiencing pain/ stiffness in your operative extremity.    2. Activities of Daily Living:  ? No tub baths, hot tubs, or swimming pools for 4 weeks  ? May shower and let water run over the incision on post-operative day #7 if no drainage. Do not scrub or rub the incision. Simply let the water run over the incision and pat dry.    II. Precautions:  ? Everyone that comes near you should wash their hands  ? No elective dental, genital-urinary, or colon procedures or surgical procedures for 12 weeks after surgery unless absolutely necessary.  ? If dental work or surgical procedure is deemed absolutely necessary during the first 12 weeks, you will need to contact your surgeon as you will need to take antibiotics 1 hour prior to any dental work (including teeth cleanings).  ? Please discuss with your surgeon prophylactic antibiotics as the length of time this intervention will be necessary for you varies with each patient’s health history and situation.  ? Avoid sick people. If you must be around someone who is ill, they should wear a mask.  ? Avoid visits to the Emergency Room or Urgent Care unless you are having a life threatening event.     III. INCISION CARE:  ? Wash your hands prior to dressing changes  ? Change the dressing as needed to keep incision clean and dry. Utilize dry gauze and paper tape. Avoid touching the side of the gauze that goes against the incision with your hands.  ? No creams or ointments to the incision  ? May remove dressing once the incision is free of drainage  ? Do not touch or pick at the incision  ? Check incision every day and notify surgeon immediately if any of the following signs or symptoms are noted:  o Increase in redness  o Increase in swelling around the incision and of the entire extremity  o Increase in pain  o Drainage oozing from the incision  o Pulling apart of the edges of the  incision  o Increase in overall body temperature (greater than 100.5 degrees)  ? Your surgeon will instruct you regarding suture or staple removal    IV. Medications:     1. Anticoagulants: You will be discharged on an anticoagulant. This is a prophylactic medication that helps prevent blood clots during your post-operative period. The type and length of dosage varies based on your individual needs, procedure performed, and surgeon’s preference.    ? While taking the anticoagulant, you should avoid taking any additional aspirin, ibuprofen (Advil or Motrin), Aleve (Naprosyn) or other non-steroidal anti-inflammatory medications.   ? Notify surgeon immediately if any ju[place] bleeding is noted in the urine, stool, emesis, or from the nose or the incision. Blood in the stool will often appear as black rather than red. Blood in urine may appear as pink. Blood in emesis may appear as brown/black like coffee grounds.  ? You will need to apply pressure for longer periods of time to any cuts or abrasions to stop bleeding  ? Avoid alcohol while taking anticoagulants    2. Stool Softeners: You will be at greater risk of constipation after surgery due to being less mobile and the pain medications.     ? Take stool softeners as instructed by your surgeon while on pain medications. Bran cereal is most effective. Over the counter Colace 100 mg 1-2 capsules twice daily.   ? Drink plenty of fluids, and eat fruits and vegetables during your recovery time    3. Pain Medications utilized after surgery are narcotics and the law requires that the following information be given to all patients that are prescribed narcotics:    ? CLASSIFICATION: Pain medications are called Opioids and are narcotics  ? LEGALITIES: It is illegal to share narcotics with others and to drive within 24 hours of taking narcotics  ? POTENTIAL SIDE EFFECTS: Potential side effects of opioids include: nausea, vomiting, itching, dizziness, drowsiness, dry mouth,  constipation, and difficulty urinating.  ? POTENTIAL ADVERSE EFFECTS:   o Opioid tolerance can develop with use of pain medications and this simply means that it requires more and more of the medication to control pain; however, this is seen more in patients that use opioids for longer periods of time.  o Opioid dependence can develop with use of Opioids and this simply means that to stop the medication can cause withdrawal symptoms; however, this is seen with patients that use Opioids for longer periods of time.  o Opioid addiction can develop with use of Opioids and the incidence of this is very unlikely in patients who take the medications as ordered and stop the medications as instructed.  o Opioid overdose can be dangerous, but is unlikely when the medication is taken as ordered and stopped when ordered. It is important not to mix opioids with alcohol or with and type of sedative such as Benadryl as this can lead to over sedation and respiratory difficulty.  ? DOSAGE:   o Pain medications will need to be taken consistently for the first week to decrease pain and promote adequate pain relief and participation in physical therapy.  o After the initial surgical pain begins to resolve, you may begin to decrease the pain medication. By the end of 6-8 weeks, you should be off of pain medications.  o Refills will not be given by the office during evening hours, on weekends, or after 6-8 weeks post-op.  o To seek refills on pain medications during the initial 6 week post-operative period, you must call the office 48 hours in advance to request the refill. The office will then notify you when to  the prescription. DO NOT wait until you are out of the medication to request a refill.    V. FOLLOW-UP VISITS:  ? You will need to follow up in the office with your surgeon in 3 weeks. Please call this number [phone] to schedule this appointment.  If you have any concerns or suspected complications prior to your  follow up visit, please call your surgeons office. Do not wait until your appointment time if you suspect complications. These will need to be addressed in the office promptly.    Discharge Medications:     1) Tramadol 50 mg  1-2 po q 4-6 hours for pain control  2)  Aspirin 81 mg po daily.      Philipp Sutton MD  1/13/2018  7:05 AM

## 2018-01-13 NOTE — PROGRESS NOTES
Continued Stay Note  [facility]     Patient Name: Liseth Flores  MRN: [id number]  Today's Date: 1/13/2018    Admit Date: 1/11/2018          Discharge Plan       01/13/18 0905    Case Management/Social Work Plan    Additional Comments Discharge orders noted. Discharge summary, orders and clinicals faxed to [facility] for review.........RADHA Ivey, CCP              Discharge Codes     None        Expected Discharge Date and Time     Expected Discharge Date Expected Discharge Time    Jan 13, 2018             Quincy Carranza RN

## 2018-01-13 NOTE — PLAN OF CARE
Problem: Patient Care Overview (Adult)  Goal: Plan of Care Review  Outcome: Ongoing (interventions implemented as appropriate)   01/13/18 0139   Coping/Psychosocial Response Interventions   Plan Of Care Reviewed With patient   Patient Care Overview   Progress improving   Outcome Evaluation   Outcome Summary/Follow up Plan VSS. PO PAIN MEDICATION CONTROLLING PAIN. AMBULATING WELL WITH 1 ASSIST. VOIDING FINE PER BRP. NO COMPLAIN OF NAUSEA. EDUCATION PROVIDED ON THE IMPORTANCE OF STRESS REDUCTION AND EATING LOW SATURATED FAT DIET TO HELP CONTROL BLOOD PRESSURE.     Goal: Adult Individualization and Mutuality  Outcome: Ongoing (interventions implemented as appropriate)    Goal: Discharge Needs Assessment  Outcome: Ongoing (interventions implemented as appropriate)      Problem: Knee Replacement, Total (Adult)  Goal: Signs and Symptoms of Listed Potential Problems Will be Absent or Manageable (Knee Replacement, Total)  Outcome: Ongoing (interventions implemented as appropriate)      Problem: Fall Risk (Adult)  Goal: Absence of Falls  Outcome: Ongoing (interventions implemented as appropriate)

## 2018-01-13 NOTE — PLAN OF CARE
Problem: Patient Care Overview (Adult)  Goal: Plan of Care Review  Outcome: Ongoing (interventions implemented as appropriate)   01/13/18 0139 01/13/18 0820 01/13/18 1127   Coping/Psychosocial Response Interventions   Plan Of Care Reviewed With --  patient --    Patient Care Overview   Progress improving --  --    Outcome Evaluation   Outcome Summary/Follow up Plan --  --  pt ambulating well with walker and assist x1. pt up to rr. dressing changed, c/d/i. vss, nvi. pain is controlled with po pain medication. vodiing without difficulty. pt demonstrated use of IS and verbalized importance of use. disccused with pt the importance of blood pressure monitoring and the use of medications to reduce HTN. pt to be d/c'd home [facility] today.      Goal: Adult Individualization and Mutuality  Outcome: Ongoing (interventions implemented as appropriate)    Goal: Discharge Needs Assessment  Outcome: Ongoing (interventions implemented as appropriate)      Problem: Perioperative Period (Adult)  Goal: Signs and Symptoms of Listed Potential Problems Will be Absent or Manageable (Perioperative Period)  Outcome: Ongoing (interventions implemented as appropriate)      Problem: Knee Replacement, Total (Adult)  Goal: Signs and Symptoms of Listed Potential Problems Will be Absent or Manageable (Knee Replacement, Total)  Outcome: Ongoing (interventions implemented as appropriate)      Problem: Fall Risk (Adult)  Goal: Identify Related Risk Factors and Signs and Symptoms  Outcome: Ongoing (interventions implemented as appropriate)    Goal: Absence of Falls  Outcome: Ongoing (interventions implemented as appropriate)

## 2018-01-13 NOTE — PROGRESS NOTES
"/  BP 98/57 (BP Location: Left arm, Patient Position: Lying)  Pulse 78  Temp 97.6 °F (36.4 °C) (Oral)   Resp 16  Ht 162.6 cm (64\")  Wt 93.8 kg (206 lb 12.8 oz)  SpO2 90%  BMI 35.5 kg/m2    Lab Results (last 24 hours)     ** No results found for the last 24 hours. **          Imaging Results (last 24 hours)     ** No results found for the last 24 hours. **          Patient Care Team:  Anthony Mace MD as PCP - General (Internal Medicine)    SUBJECTIVE  Doing well  PHYSICAL EXAM   Wound fine  Active Problems:    Osteoarthritis, knee    Decreased mobility      PLAN / DISPOSITION:   discharge today  Philipp Sutton MD  01/13/18  7:00 AM      "

## 2018-01-13 NOTE — DISCHARGE INSTRUCTIONS
General Information: The length of hospital stay after knee and hip replacement surgery has, over the last several years,  decreased significantly. Reasons for this include concerns regarding costs and attempts to keep those costs down. However, as surgeons have improved their surgical techniques, as those techniques have become less invasive, and as pain management has improved with the [employer] of longer acting blocks that do not interfere with early Physical Therapy, safe early hospital discharge has become common place.    Discharge Home vs Rehab: Though it may seem intuitive that being discharged from the hospital to a rehab facility would be advantageous due to the seeming greater availability of physical therapy, the reverse is actually true. When patients return to the office at the 3-week post op anna, those patients who have been discharged to their home environment consistently out preform those patients who spent time in an inpatient rehab facility. There are several reasons for this. First, home health nursing and physical therapy services have over the years become cutting edge. Second, when placed in the home environment, patients inevitably will do more for themselves in terms of self-care and mobilization. The ‘getting up and doing for oneself’ is in and of itself, physical therapy. Even the task of managing stairs at home is a form of therapy. Those patients who go home after a brief hospital stay typically are walking better, have more of a spring in their step, are functioning more independently, and are closer to getting back to a normal lifestyle than those who have spent time in an inpatient rehab facility. In addition, my mantra is this: if you don’t want to get sick, stay away from places where sick people are. Going home is the safest and best option after hospital discharge for any patient who is not totally alone and can arrange for any kind of help at home (this is another reason, by  the way ,to try to get out of the hospital sooner rather than later).    Specific Post Op Instructions:  Blood Thinners: All patients will be on some type of blood thinner post op to prevent blood clots. Often a blood thinning shot is used while in the hospital. Most patients who are not high risk are discharged on aspirin (either 325 mg or 81 mg depending on age and size). High risk patients may be discharged on a more potent oral or injectable form of a blood thinner. Keep in mind that the more aggressive the blood thinner used, the greater the risk of bleeding complications post op. This is always a balancing act.  Ice: Early post op until the swelling diminishes, ice should be applied to the knee for 30 minutes out of every 2 hours while awake.  Staples: Staples are taken out at 14 days post op. Usually the nurse at home will perform this task.  Dressing Changes: If the wound is clean and dry with no redness or drainage, the dressings can be discontinued on the 3rd or 4th day post op.  Showering: The edges of the wound take 3 days after surgery to seal. Given the magnitude of hip and knee replacement surgery, I always like to build in a safety margin in  terms of getting the wound wet. Thus waiting till the 5th -7th day post op is recommended before showering. Do not soak the wound in water till the staples are out and the staple puncture wounds have healed. If there is any redness or drainage, the wound should be kept dry and covered by a sterile dressing until this resolves.  Weight Bearing/Ambulatory Aids: Most hip and knee replacments are implanted such that full immediate weight bearing is allowed. The walker and cane can be discarded when tolerated. There are a few special circumstances where 6 weeks of protected weight bearing may be required.  Follow-up Appointment: The first follow-up office appointment is at the 3-week anna post op. You need to call to set up this appointment. We want to see you back  sooner if there are any problems.  Physical Therapy: Generally about 6 weeks of therapy is required after hip and knee replacement surgery. Hips usually require less PT than do knees. Usually the first 3 weeks or so of PT is done at home. Knees especially often need to follow that up with 3 weeks of outpatient therapy.  Driving a Car: With left leg surgery, driving is permitted when the patient is off of pain medication and feels sufficiently alert and strong enough to physically handle a car safely. Due to liability issues, it is generally recommended to not drive until 6 weeks post op after right leg surgery.  Returning to Daily and Recreational Activities: For the most part, patients can progress to daily activities as tolerated. Initially, it is best not to “overdo it” in an attempt to minimize early post op swelling. Once the swelling is controlled, the patient may progress as tolerated. It usually is 6 weeks before patients feel up to most all daily activities, and 3 months before feeling up to more vigorous physical activities. A golfer might start back playing at about 6 weeks. A  would probably not feel up to resuming playing until 3 months.  Return to Work: My basic premise is this: I am not the work police. I try to implant both knee and hip replacements such that it is safe to perform any activity that the patient can tolerate and then let the patient decide. The average time until returning to a sedentary job is 6 weeks. The average time until returning to a physical job is 3 months. There is great variability. The return to work date depends on many factors. It often depends in part on the patient’s perceived need to work, flexibility of demands in the work place environment, and other social and physical factors. Suffice to say that we will provide you with a note to return to work whenever you think you can manage whatever it is you will face on returning to work.

## 2018-01-13 NOTE — PROGRESS NOTES
Continued Stay Note  [facility]     Patient Name: Liseth Flores  MRN: [id number]  Today's Date: 1/13/2018    Admit Date: 1/11/2018          Discharge Plan       01/13/18 1050    Case Management/Social Work Plan    Additional Comments S/w Alina with [facility] to inform of discharge home..........RADHA Ivey, CCP      01/13/18 0905    Case Management/Social Work Plan    Additional Comments Discharge orders noted. Discharge summary, orders and clinicals faxed to [facility] intake for review.........RADHA Ivey, CCP              Discharge Codes     None        Expected Discharge Date and Time     Expected Discharge Date Expected Discharge Time    Jan 13, 2018             Quincy Carranza RN

## 2018-01-13 NOTE — PLAN OF CARE
Problem: Patient Care Overview (Adult)  Goal: Plan of Care Review  Outcome: Ongoing (interventions implemented as appropriate)   01/13/18 1248   Coping/Psychosocial Response Interventions   Plan Of Care Reviewed With patient   Patient Care Overview   Progress progress toward functional goals as expected   Outcome Evaluation   Outcome Summary/Follow up Plan Pt increasing with strength and transfer safety to RWX

## 2018-01-13 NOTE — THERAPY TREATMENT NOTE
Acute Care - Physical Therapy Treatment Note  [facility]     Patient Name: Liseth Flores  : 1945  MRN: [id number]  Today's Date: 2018        Referring Physician: Herman    Admmehnaz Date: 2018    Visit Dx:    ICD-10-CM ICD-9-CM   1. Decreased mobility R26.89 781.99     Patient Active Problem List   Diagnosis   • Osteoarthritis, knee   • Decreased mobility               Adult Rehabilitation Note       18 1200 18 1540 18 1200    Rehab Assessment/Intervention    Discipline physical therapy assistant  -CW physical therapy assistant  -JM physical therapy assistant  -JM    Document Type therapy note (daily note)  -CW therapy note (daily note)  -JM therapy note (daily note)  -    Subjective Information agree to therapy;complains of;pain  -CW agree to therapy;complains of;weakness;fatigue;pain  -JM agree to therapy;complains of;fatigue;pain;swelling;nausea/vomiting  -    Patient Effort, Rehab Treatment good  -CW      Symptoms Noted During/After Treatment   fatigue;increased pain  -    Precautions/Limitations fall precautions  -CW fall precautions  - fall precautions  -JM    Recorded by [CW] Jabari Hammonds PTA [JM] Lea Ramirez PTA [JM] Lea Ramirez PTA    Vital Signs    O2 Delivery Pre Treatment room air  -CW      Recorded by [CW] Jabari Hammonds PTA      Pain Assessment    Pain Assessment 0-10  -CW 0-10  -JM 0-10  -JM    Pain Score 5  -CW 8  -JM 8  -JM    Post Pain Score 5  -CW      Pain Type Surgical pain  -CW Surgical pain  -JM Surgical pain  -JM    Pain Location Knee  -CW Knee  -JM Knee  -JM    Pain Orientation Left  -CW Left  -JM Left  -JM    Pain Intervention(s) Repositioned;Ambulation/increased activity  -CW Medication (See MAR);Repositioned;Cold applied  -JM Medication (See MAR);Repositioned;Cold applied  -JM    Response to Interventions emilio  -CW emilio, unchanged  -JM emilio  -JM    Recorded by [CW] Jabari Hammonds PTA [JM] Lea Ramirez PTA [JM] Lea  JANUSZ Ramirez    Cognitive Assessment/Intervention    Current Cognitive/Communication Assessment functional  -CW      Orientation Status oriented x 4  -CW      Follows Commands/Answers Questions 100% of the time  -CW      Personal Safety WNL/WFL  -CW      Personal Safety Interventions fall prevention program maintained;gait belt;muscle strengthening facilitated;nonskid shoes/slippers when out of bed  -CW      Recorded by [CW] Jabari Hammonds PTA      ROM (Range of Motion)    General ROM Detail 10-90  -CW  -6-90  -JM    Recorded by [CW] Jabari Hammonds PTA  [JM] Lea Ramirez PTA    Bed Mobility, Assessment/Treatment    Bed Mob, Supine to Sit, [place] not tested  -CW      Bed Mobility, Comment in chair  -CW  in chair  -    Recorded by [CW] Jabari Hammonds PTA  [JM] Lea Ramirez PTA    Transfer Assessment/Treatment    Transfers, Sit-Stand [place] contact guard assist;verbal cues required  - minimum assist (75% patient effort);contact guard assist;verbal cues required;nonverbal cues required (demo/gesture)  - minimum assist (75% patient effort);verbal cues required;nonverbal cues required (demo/gesture)   2 attempts req  -JM    Transfers, Stand-Sit [place] contact guard assist;verbal cues required  - contact guard assist;verbal cues required  - contact guard assist;verbal cues required  -JM    Transfers, Sit-Stand-Sit, Assist Device rolling walker  -CW rolling walker  -JM rolling walker  -JM    Recorded by [CW] Jabari Hammonds PTA [JM] Lea Ramirez PTA [JM] Lea Ramirez PTA    Gait Assessment/Treatment    Gait, [place] Level contact guard assist;verbal cues required  - contact guard assist;verbal cues required  - contact guard assist;verbal cues required  -    Gait, Assistive Device rolling walker  -CW rolling walker  -JM rolling walker  -JM    Gait, Distance (Feet) 40  -CW 20  -JM 20  -JM    Gait, Safety Issues  balance decreased during turns;step length  decreased;weight-shifting ability decreased  -JM step length decreased;weight-shifting ability decreased;balance decreased during turns  -JM    Gait, Impairments strength decreased;impaired balance;pain  -CW strength decreased;impaired balance;pain  -JM strength decreased;impaired balance;pain  -JM    Gait, Comment  decr endurance limiting dist  -JM nausea limiting dist  -JM    Recorded by [CW] Jabari Hammonds PTA [JM] Lea Ramirez PTA [JM] Lea Ramirez PTA    Stairs Assessment/Treatment    Number of Stairs  4  -JM     Stairs, Handrail Location  right side (ascending)  -     Stairs, [place] Level  contact guard assist;verbal cues required  -     Stairs, Technique Used  step to step (ascending);step to step (descending)  -JM     Stairs, Comment  fam present for educ  -JM try next session if pn allows  -JM    Recorded by  [JM] Lea Ramirez PTA [JM] Lea Ramirez PTA    Therapy Exercises    Exercise Protocols total knee  -CW total knee  -JM total knee  -JM    Total Knee Exercises left:;25 reps;completed protocol   cues for each  -CW left:;completed protocol;20 reps   cues for each  -JM left:;15 reps;completed protocol   cues for each  -JM    Recorded by [CW] Jabari Hammonds PTA [JM] Lea Ramirez PTA [JM] Lea Ramirez PTA    Positioning and Restraints    Pre-Treatment Position sitting in chair/recliner  -CW sitting in chair/recliner  -JM sitting in chair/recliner  -JM    Post Treatment Position chair  -CW  chair  -JM    In Chair notified nsg;reclined;call light within reach;encouraged to call for assist;with family/caregiver  -CW reclined;with family/caregiver;encouraged to call for assist  -JM reclined;with family/caregiver;encouraged to call for assist  -JM    Recorded by [CW] Jabari Hammonds PTA [JM] Lea Ramirez PTA [JM] Lea Ramirez PTA      User Key  (r) = Recorded By, (t) = Taken By, (c) = Cosigned By    Initials Name Effective Dates    MK Ramirez PTA  02/18/16 -     CW Jabari NORWOOD Cassius, PTA 12/13/16 -                 IP PT Goals       01/11/18 1633          Bed Mobility PT LTG    Bed Mobility PT LTG, Date Established 01/11/18  -MG      Bed Mobility PT LTG, Time to Achieve 4 days  -MG      Bed Mobility PT LTG, Activity Type all bed mobility  -MG      Bed Mobility PT LTG, [place] Level independent  -MG      Transfer Training PT LTG    Transfer Training PT LTG, Date Established 01/11/18  -MG      Transfer Training PT LTG, Time to Achieve 4 days  -MG      Transfer Training PT LTG, Activity Type bed to chair /chair to bed;sit to stand/stand to sit  -MG      Transfer Training PT LTG, [place] Level supervision required  -MG      Transfer Training PT LTG, Assist Device walker, rolling  -MG      Gait Training PT LTG    Gait Training Goal PT LTG, Date Established 01/11/18  -MG      Gait Training Goal PT LTG, Time to Achieve 4 days  -MG      Gait Training Goal PT LTG, [place] Level supervision required  -MG      Gait Training Goal PT LTG, Assist Device walker, rolling  -MG      Gait Training Goal PT LTG, Distance to Achieve 100  -MG      Stair Training PT LTG    Stair Training Goal PT LTG, Date Established 01/11/18  -MG      Stair Training Goal PT LTG, Time to Achieve 4 days  -MG      Stair Training Goal PT LTG, Number of Steps 3  -MG      Stair Training Goal PT LTG, [place] Level supervision required  -MG      Stair Training Goal PT LTG, Assist Device 1 handrail  -MG        User Key  (r) = Recorded By, (t) = Taken By, (c) = Cosigned By    Initials Name Provider Type    MG Megan Gosselin, PT Physical Therapist          Physical Therapy Education     Title: PT OT SLP Therapies (Done)     Topic: Physical Therapy (Done)     Point: Mobility training (Done)    Learning Progress Summary    Learner Readiness Method Response Comment Documented by Status   Patient Acceptance TY GASCA DU CW 01/13/18 1248 Done    Acceptance TY GASCA D VU, NR JM 01/12/18 1255 Done     Acceptance E VU,NR  MG 01/11/18 1632 Done   Family Acceptance E,TB,D VU,NR   01/12/18 1255 Done               Point: Home exercise program (Done)    Learning Progress Summary    Learner Readiness Method Response Comment Documented by Status   Patient Acceptance E,TB VU,DU   01/13/18 1248 Done    Acceptance E,TB,D VU,NR   01/12/18 1255 Done    Acceptance E VU,NR  MG 01/11/18 1632 Done   Family Acceptance E,TB,D VU,NR   01/12/18 1255 Done               Point: Body mechanics (Done)    Learning Progress Summary    Learner Readiness Method Response Comment Documented by Status   Patient Acceptance E,TB VU,DU   01/13/18 1248 Done    Acceptance E,TB,D VU,NR   01/12/18 1255 Done    Acceptance E VU,NR  MG 01/11/18 1632 Done   Family Acceptance E,TB,D VU,NR   01/12/18 1255 Done               Point: Precautions (Done)    Learning Progress Summary    Learner Readiness Method Response Comment Documented by Status   Patient Acceptance E,TB VU,DU   01/13/18 1248 Done    Acceptance E,TB,D VU,NR   01/12/18 1255 Done    Acceptance E VU,NR  MG 01/11/18 1632 Done   Family Acceptance E,TB,D VU,NR   01/12/18 1255 Done                      User Key     Initials Effective Dates Name Provider Type Discipline     02/18/16 -  Lea Ramirez PTA Physical Therapy Assistant PT     12/13/16 -  Jabari Hammonds PTA Physical Therapy Assistant PT     09/13/17 -  Megan Gosselin, PT Physical Therapist PT                    PT Recommendation and Plan  Anticipated Discharge Disposition: home with home health  PT Frequency: 2 times/day  Plan of Care Review  Plan Of Care Reviewed With: patient  Progress: progress toward functional goals as expected  Outcome Summary/Follow up Plan: Pt increasing with strength and transfer safety to X           Outcome Measures       01/13/18 1200 01/12/18 1200 01/11/18 1600    How much help from another person do you currently need...    Turning from your back to your side while in flat bed  without using bedrails? 3  -CW 3  -JM 3  -MG    Moving from lying on back to sitting on the side of a flat bed without bedrails? 3  -CW 3  -JM 3  -MG    Moving to and from a bed to a chair (including a wheelchair)? 3  -CW 3  -JM 3  -MG    Standing up from a chair using your arms (e.g., wheelchair, bedside chair)? 3  -CW 3  -JM 3  -MG    Climbing 3-5 steps with a railing? 2  -CW 2  -JM 2  -MG    To walk in hospital room? 3  -CW 3  -JM 3  -MG    AM-PAC 6 Clicks Score 17  -CW 17  -JM 17  -MG    Functional Assessment    Outcome Measure Options AM-PAC 6 Clicks Basic Mobility (PT)  -CW  AM-PAC 6 Clicks Basic Mobility (PT)  -MG      User Key  (r) = Recorded By, (t) = Taken By, (c) = Cosigned By    Initials Name Provider Type    MK Ramirez PTA Physical Therapy Assistant    JHON Hammonds PTA Physical Therapy Assistant    MG Megan Gosselin, PT Physical Therapist           Time Calculation:         PT Charges       01/13/18 1249          Time Calculation    Start Time 1030  -CW      Stop Time 1115  -CW      Time Calculation (min) 45 min  -CW      PT Received On 01/13/18  -CW        User Key  (r) = Recorded By, (t) = Taken By, (c) = Cosigned By    Initials Name Provider Type    JHON Hammonds PTA Physical Therapy Assistant          Therapy Charges for Today     Code Description Service Date Service Provider Modifiers Qty    44556128139 HC PT THER PROC GROUP 1/13/2018 Jabari Hammonds PTA GP 1    40292340501 HC PT THER PROC EA 15 MIN 1/13/2018 Jabari Hammonds PTA GP 1          PT G-Codes  PT Professional Judgement Used?: Yes  Outcome Measure Options: AM-PAC 6 Clicks Basic Mobility (PT)  Score: 17  Functional Limitation: Mobility: Walking and moving around  Mobility: Walking and Moving Around Current Status (): At least 40 percent but less than 60 percent impaired, limited or restricted  Mobility: Walking and Moving Around Goal Status (): At least 1 percent but less than 20 percent  impaired, limited or restricted    Jabari Hammonds, PTA  1/13/2018

## 2018-09-13 ENCOUNTER — APPOINTMENT (OUTPATIENT)
Dept: WOMENS IMAGING | Facility: HOSPITAL | Age: 73
End: 2018-09-13

## 2018-09-13 PROCEDURE — 77067 SCR MAMMO BI INCL CAD: CPT | Performed by: RADIOLOGY

## 2018-09-13 PROCEDURE — 77063 BREAST TOMOSYNTHESIS BI: CPT | Performed by: RADIOLOGY

## 2019-09-16 ENCOUNTER — APPOINTMENT (OUTPATIENT)
Dept: WOMENS IMAGING | Facility: HOSPITAL | Age: 74
End: 2019-09-16

## 2019-09-16 PROCEDURE — 77067 SCR MAMMO BI INCL CAD: CPT | Performed by: RADIOLOGY

## 2019-09-16 PROCEDURE — 77063 BREAST TOMOSYNTHESIS BI: CPT | Performed by: RADIOLOGY

## 2020-11-10 ENCOUNTER — APPOINTMENT (OUTPATIENT)
Dept: WOMENS IMAGING | Facility: HOSPITAL | Age: 75
End: 2020-11-10

## 2020-11-10 PROCEDURE — 77063 BREAST TOMOSYNTHESIS BI: CPT | Performed by: RADIOLOGY

## 2020-11-10 PROCEDURE — 77067 SCR MAMMO BI INCL CAD: CPT | Performed by: RADIOLOGY

## 2021-02-25 ENCOUNTER — OFFICE (OUTPATIENT)
Dept: URBAN - METROPOLITAN AREA CLINIC 94 | Facility: CLINIC | Age: 76
End: 2021-02-25

## 2021-02-25 VITALS — HEIGHT: 64 IN | HEART RATE: 69 BPM | WEIGHT: 209 LBS | TEMPERATURE: 97.3 F | OXYGEN SATURATION: 98 %

## 2021-02-25 DIAGNOSIS — Z86.010 PERSONAL HISTORY OF COLONIC POLYPS: ICD-10-CM

## 2021-02-25 DIAGNOSIS — R13.10 DYSPHAGIA, UNSPECIFIED: ICD-10-CM

## 2021-02-25 DIAGNOSIS — K21.9 GASTRO-ESOPHAGEAL REFLUX DISEASE WITHOUT ESOPHAGITIS: ICD-10-CM

## 2021-02-25 DIAGNOSIS — Z85.3 PERSONAL HISTORY OF MALIGNANT NEOPLASM OF BREAST: ICD-10-CM

## 2021-02-25 PROCEDURE — 99204 OFFICE O/P NEW MOD 45 MIN: CPT | Performed by: INTERNAL MEDICINE

## 2021-02-25 RX ORDER — LANSOPRAZOLE 30 MG/1
30 CAPSULE, DELAYED RELEASE PELLETS ORAL
Qty: 90 | Refills: 2 | Status: ACTIVE
Start: 2021-02-25

## 2021-02-25 RX ORDER — PANTOPRAZOLE SODIUM 40 MG/1
TABLET, DELAYED RELEASE ORAL
Qty: 90 | Status: COMPLETED
End: 2021-02-25

## 2021-05-20 VITALS
DIASTOLIC BLOOD PRESSURE: 54 MMHG | HEIGHT: 64 IN | OXYGEN SATURATION: 94 % | SYSTOLIC BLOOD PRESSURE: 123 MMHG | SYSTOLIC BLOOD PRESSURE: 129 MMHG | SYSTOLIC BLOOD PRESSURE: 107 MMHG | HEART RATE: 70 BPM | RESPIRATION RATE: 16 BRPM | SYSTOLIC BLOOD PRESSURE: 115 MMHG | DIASTOLIC BLOOD PRESSURE: 66 MMHG | DIASTOLIC BLOOD PRESSURE: 65 MMHG | DIASTOLIC BLOOD PRESSURE: 63 MMHG | RESPIRATION RATE: 11 BRPM | TEMPERATURE: 97 F | HEART RATE: 64 BPM | OXYGEN SATURATION: 93 % | HEART RATE: 79 BPM | DIASTOLIC BLOOD PRESSURE: 48 MMHG | HEART RATE: 75 BPM | HEART RATE: 74 BPM | HEART RATE: 66 BPM | OXYGEN SATURATION: 64 % | TEMPERATURE: 96.9 F | SYSTOLIC BLOOD PRESSURE: 109 MMHG | HEART RATE: 71 BPM | DIASTOLIC BLOOD PRESSURE: 58 MMHG | HEART RATE: 77 BPM | RESPIRATION RATE: 14 BRPM | WEIGHT: 209 LBS | SYSTOLIC BLOOD PRESSURE: 139 MMHG | OXYGEN SATURATION: 99 % | OXYGEN SATURATION: 92 % | DIASTOLIC BLOOD PRESSURE: 104 MMHG | SYSTOLIC BLOOD PRESSURE: 126 MMHG | DIASTOLIC BLOOD PRESSURE: 61 MMHG | OXYGEN SATURATION: 98 %

## 2021-05-21 ENCOUNTER — AMBULATORY SURGICAL CENTER (OUTPATIENT)
Dept: URBAN - METROPOLITAN AREA SURGERY 17 | Facility: SURGERY | Age: 76
End: 2021-05-21

## 2021-05-21 DIAGNOSIS — K21.9 GASTRO-ESOPHAGEAL REFLUX DISEASE WITHOUT ESOPHAGITIS: ICD-10-CM

## 2021-05-21 DIAGNOSIS — K22.2 ESOPHAGEAL OBSTRUCTION: ICD-10-CM

## 2021-05-21 DIAGNOSIS — K44.9 DIAPHRAGMATIC HERNIA WITHOUT OBSTRUCTION OR GANGRENE: ICD-10-CM

## 2021-05-21 DIAGNOSIS — R13.10 DYSPHAGIA, UNSPECIFIED: ICD-10-CM

## 2021-05-21 DIAGNOSIS — K31.7 POLYP OF STOMACH AND DUODENUM: ICD-10-CM

## 2021-05-21 PROCEDURE — 43249 ESOPH EGD DILATION <30 MM: CPT | Performed by: INTERNAL MEDICINE

## 2021-06-23 ENCOUNTER — OFFICE (OUTPATIENT)
Dept: URBAN - METROPOLITAN AREA CLINIC 75 | Facility: CLINIC | Age: 76
End: 2021-06-23

## 2021-06-23 VITALS
HEIGHT: 64 IN | SYSTOLIC BLOOD PRESSURE: 130 MMHG | WEIGHT: 211 LBS | HEART RATE: 77 BPM | OXYGEN SATURATION: 96 % | DIASTOLIC BLOOD PRESSURE: 80 MMHG

## 2021-06-23 DIAGNOSIS — K44.9 DIAPHRAGMATIC HERNIA WITHOUT OBSTRUCTION OR GANGRENE: ICD-10-CM

## 2021-06-23 DIAGNOSIS — Z86.010 PERSONAL HISTORY OF COLONIC POLYPS: ICD-10-CM

## 2021-06-23 DIAGNOSIS — K21.9 GASTRO-ESOPHAGEAL REFLUX DISEASE WITHOUT ESOPHAGITIS: ICD-10-CM

## 2021-06-23 DIAGNOSIS — K22.2 ESOPHAGEAL OBSTRUCTION: ICD-10-CM

## 2021-06-23 DIAGNOSIS — K31.7 POLYP OF STOMACH AND DUODENUM: ICD-10-CM

## 2021-06-23 DIAGNOSIS — Z85.3 PERSONAL HISTORY OF MALIGNANT NEOPLASM OF BREAST: ICD-10-CM

## 2021-06-23 PROCEDURE — 99214 OFFICE O/P EST MOD 30 MIN: CPT | Performed by: NURSE PRACTITIONER

## 2021-06-23 RX ORDER — LANSOPRAZOLE 30 MG/1
30 CAPSULE, DELAYED RELEASE PELLETS ORAL
Qty: 90 | Refills: 2 | Status: ACTIVE
Start: 2021-02-25

## 2021-11-12 ENCOUNTER — APPOINTMENT (OUTPATIENT)
Dept: WOMENS IMAGING | Facility: HOSPITAL | Age: 76
End: 2021-11-12

## 2021-11-12 PROCEDURE — 77067 SCR MAMMO BI INCL CAD: CPT | Performed by: RADIOLOGY

## 2021-11-12 PROCEDURE — 77063 BREAST TOMOSYNTHESIS BI: CPT | Performed by: RADIOLOGY

## 2022-01-12 VITALS
HEART RATE: 60 BPM | OXYGEN SATURATION: 92 % | DIASTOLIC BLOOD PRESSURE: 70 MMHG | WEIGHT: 215 LBS | SYSTOLIC BLOOD PRESSURE: 120 MMHG | HEIGHT: 64 IN

## 2022-01-13 ENCOUNTER — OFFICE (OUTPATIENT)
Dept: URBAN - METROPOLITAN AREA CLINIC 94 | Facility: CLINIC | Age: 77
End: 2022-01-13

## 2022-01-13 DIAGNOSIS — K21.9 GASTRO-ESOPHAGEAL REFLUX DISEASE WITHOUT ESOPHAGITIS: ICD-10-CM

## 2022-01-13 PROCEDURE — 99213 OFFICE O/P EST LOW 20 MIN: CPT | Performed by: INTERNAL MEDICINE

## 2022-11-15 ENCOUNTER — APPOINTMENT (OUTPATIENT)
Dept: WOMENS IMAGING | Facility: HOSPITAL | Age: 77
End: 2022-11-15

## 2022-11-15 PROCEDURE — 77067 SCR MAMMO BI INCL CAD: CPT | Performed by: RADIOLOGY

## 2022-11-15 PROCEDURE — 77063 BREAST TOMOSYNTHESIS BI: CPT | Performed by: RADIOLOGY

## 2023-06-20 VITALS
DIASTOLIC BLOOD PRESSURE: 72 MMHG | SYSTOLIC BLOOD PRESSURE: 110 MMHG | HEIGHT: 64 IN | WEIGHT: 200 LBS | HEART RATE: 88 BPM | OXYGEN SATURATION: 93 %

## 2023-06-20 PROBLEM — K44.9 DIAPHRAGMATIC HERNIA WITHOUT OBSTRUCTION OR GANGRENE: Status: ACTIVE | Noted: 2021-05-21

## 2023-06-20 PROBLEM — K31.7 POLYP OF STOMACH AND DUODENUM: Status: ACTIVE | Noted: 2021-05-21

## 2023-06-20 PROBLEM — K22.2 ESOPHAGEAL OBSTRUCTION: Status: ACTIVE | Noted: 2021-05-21

## 2023-06-22 ENCOUNTER — OFFICE (OUTPATIENT)
Dept: URBAN - METROPOLITAN AREA CLINIC 94 | Facility: CLINIC | Age: 78
End: 2023-06-22

## 2023-06-22 DIAGNOSIS — R13.10 DYSPHAGIA, UNSPECIFIED: ICD-10-CM

## 2023-06-22 DIAGNOSIS — K22.2 ESOPHAGEAL OBSTRUCTION: ICD-10-CM

## 2023-06-22 DIAGNOSIS — Z86.010 PERSONAL HISTORY OF COLONIC POLYPS: ICD-10-CM

## 2023-06-22 DIAGNOSIS — K21.9 GASTRO-ESOPHAGEAL REFLUX DISEASE WITHOUT ESOPHAGITIS: ICD-10-CM

## 2023-06-22 DIAGNOSIS — Z85.3 PERSONAL HISTORY OF MALIGNANT NEOPLASM OF BREAST: ICD-10-CM

## 2023-06-22 DIAGNOSIS — K44.9 DIAPHRAGMATIC HERNIA WITHOUT OBSTRUCTION OR GANGRENE: ICD-10-CM

## 2023-06-22 DIAGNOSIS — K31.7 POLYP OF STOMACH AND DUODENUM: ICD-10-CM

## 2023-06-22 PROCEDURE — 99213 OFFICE O/P EST LOW 20 MIN: CPT | Performed by: INTERNAL MEDICINE

## 2023-11-21 ENCOUNTER — APPOINTMENT (OUTPATIENT)
Dept: WOMENS IMAGING | Facility: HOSPITAL | Age: 78
End: 2023-11-21
Payer: MEDICARE

## 2023-11-21 PROCEDURE — 77067 SCR MAMMO BI INCL CAD: CPT | Performed by: RADIOLOGY

## 2023-11-21 PROCEDURE — 77063 BREAST TOMOSYNTHESIS BI: CPT | Performed by: RADIOLOGY

## 2024-12-04 ENCOUNTER — APPOINTMENT (OUTPATIENT)
Dept: WOMENS IMAGING | Facility: HOSPITAL | Age: 79
End: 2024-12-04
Payer: MEDICARE

## 2024-12-04 PROCEDURE — 77067 SCR MAMMO BI INCL CAD: CPT | Performed by: RADIOLOGY

## 2024-12-04 PROCEDURE — 77063 BREAST TOMOSYNTHESIS BI: CPT | Performed by: RADIOLOGY

## 2025-03-19 VITALS
HEIGHT: 64 IN | WEIGHT: 203 LBS | DIASTOLIC BLOOD PRESSURE: 62 MMHG | OXYGEN SATURATION: 98 % | SYSTOLIC BLOOD PRESSURE: 128 MMHG | HEART RATE: 86 BPM

## 2025-03-19 PROBLEM — K31.7 POLYP OF STOMACH AND DUODENUM: Status: ACTIVE | Noted: 2021-05-21

## 2025-03-19 PROBLEM — K44.9 DIAPHRAGMATIC HERNIA WITHOUT OBSTRUCTION OR GANGRENE: Status: ACTIVE | Noted: 2021-05-21

## 2025-03-20 ENCOUNTER — OFFICE (OUTPATIENT)
Dept: URBAN - METROPOLITAN AREA CLINIC 94 | Facility: CLINIC | Age: 80
End: 2025-03-20
Payer: MEDICARE

## 2025-03-20 DIAGNOSIS — Z86.0100 PERSONAL HISTORY OF COLON POLYPS, UNSPECIFIED: ICD-10-CM

## 2025-03-20 DIAGNOSIS — K21.9 GASTRO-ESOPHAGEAL REFLUX DISEASE WITHOUT ESOPHAGITIS: ICD-10-CM

## 2025-03-20 DIAGNOSIS — K31.7 POLYP OF STOMACH AND DUODENUM: ICD-10-CM

## 2025-03-20 DIAGNOSIS — K44.9 DIAPHRAGMATIC HERNIA WITHOUT OBSTRUCTION OR GANGRENE: ICD-10-CM

## 2025-03-20 DIAGNOSIS — R13.10 DYSPHAGIA, UNSPECIFIED: ICD-10-CM

## 2025-03-20 DIAGNOSIS — Z85.3 PERSONAL HISTORY OF MALIGNANT NEOPLASM OF BREAST: ICD-10-CM

## 2025-03-20 PROCEDURE — 99213 OFFICE O/P EST LOW 20 MIN: CPT | Performed by: INTERNAL MEDICINE

## 2025-03-20 RX ORDER — OMEPRAZOLE 40 MG/1
40 CAPSULE, DELAYED RELEASE ORAL
Qty: 30 | Refills: 11 | Status: ACTIVE
Start: 2025-03-20

## (undated) DEVICE — BNDG ELAS ELITE V/CLOSE 6IN 5YD LF STRL

## (undated) DEVICE — DRSNG ADAPTIC 3X8

## (undated) DEVICE — DRSNG WND GZ CURAD OIL EMULSION 3X8IN LF STRL 1PK

## (undated) DEVICE — ANTIBACTERIAL UNDYED BRAIDED (POLYGLACTIN 910), SYNTHETIC ABSORBABLE SUTURE: Brand: COATED VICRYL

## (undated) DEVICE — SOL NACL 0.9PCT 1000ML

## (undated) DEVICE — STPLR SKIN VISISTAT WD 35CT

## (undated) DEVICE — GLV SURG TRIUMPH CLASSIC PF LTX 8.5 STRL

## (undated) DEVICE — BANDAGE,GAUZE,BULKEE II,4.5"X4.1YD,STRL: Brand: MEDLINE

## (undated) DEVICE — DUAL CUT SAGITTAL BLADE

## (undated) DEVICE — UNDERCAST PADDING: Brand: DEROYAL

## (undated) DEVICE — APPL DURAPREP IODOPHOR APL 26ML

## (undated) DEVICE — SYR LUERLOK 20CC

## (undated) DEVICE — PK KN TOTL 40

## (undated) DEVICE — GAUZE,SPONGE,FLUFF,6"X6.75",STRL,10/TRAY: Brand: MEDLINE

## (undated) DEVICE — GAUZE,SPONGE,FLUFF,6"X6.75",STRL,5/TRAY: Brand: MEDLINE

## (undated) DEVICE — NDL SPINE 20G 3 1/2 YEL STRL 1P/U

## (undated) DEVICE — KT DRN EVAC WND PVC PCH WTROC RND 10F400

## (undated) DEVICE — 2108 SERIES SAGITTAL BLADE, NO OFFSET  (12.4 X 1.19 X 82.1MM)

## (undated) DEVICE — ENCORE® LATEX ORTHO SIZE 8.5, STERILE LATEX POWDER-FREE SURGICAL GLOVE: Brand: ENCORE